# Patient Record
Sex: MALE | Race: WHITE | NOT HISPANIC OR LATINO | Employment: OTHER | URBAN - METROPOLITAN AREA
[De-identification: names, ages, dates, MRNs, and addresses within clinical notes are randomized per-mention and may not be internally consistent; named-entity substitution may affect disease eponyms.]

---

## 2018-01-09 ENCOUNTER — OFFICE VISIT (OUTPATIENT)
Dept: FAMILY MEDICINE CLINIC | Facility: CLINIC | Age: 63
End: 2018-01-09

## 2018-01-09 VITALS
WEIGHT: 196 LBS | SYSTOLIC BLOOD PRESSURE: 130 MMHG | OXYGEN SATURATION: 98 % | HEART RATE: 72 BPM | DIASTOLIC BLOOD PRESSURE: 92 MMHG | BODY MASS INDEX: 29.7 KG/M2 | TEMPERATURE: 98.5 F | HEIGHT: 68 IN

## 2018-01-09 DIAGNOSIS — K62.89 CYST OF PERIANAL AREA: Primary | ICD-10-CM

## 2018-01-09 PROCEDURE — 99213 OFFICE O/P EST LOW 20 MIN: CPT | Performed by: INTERNAL MEDICINE

## 2018-01-09 RX ORDER — SULFAMETHOXAZOLE AND TRIMETHOPRIM 800; 160 MG/1; MG/1
1 TABLET ORAL 2 TIMES DAILY
Qty: 20 TABLET | Refills: 0 | Status: SHIPPED | OUTPATIENT
Start: 2018-01-09 | End: 2018-02-20

## 2018-01-09 NOTE — PROGRESS NOTES
Subjective   Maximus Bhardwaj Sr. is a 62 y.o. male.   Patient with irritation with some drainage noted in the peroneal area is been going on for a couple weeks.  Mild soreness with this.   History of Present Illness   As above.  There is no blood when he wipes himself does not see blood in the stool per se.  No reported temperature with this.  No prior problems with this.  No history of pilonidal cyst.    Review of Systems   All other systems reviewed and are negative.      Objective   Vitals:    01/09/18 0845   BP: 130/92   Pulse: 72   Temp: 98.5 °F (36.9 °C)   SpO2: 98%   Weight: 88.9 kg (196 lb)     Physical Exam   Constitutional: He appears well-developed and well-nourished.   HENT:   Head: Normocephalic and atraumatic.   Cardiovascular: Normal rate, regular rhythm and normal heart sounds.    Pulmonary/Chest: Effort normal and breath sounds normal.   Abdominal: Soft. Bowel sounds are normal.   Genitourinary:   Genitourinary Comments: No noted inguinal hernias with visual inspection of the front inguinal areas.  Look at the perineum there is an area approximately 2 cm depth with right side being more to the left was painful to touch minimal redness is questionable poor on the left side is not most tender side.  Findings consistent.  With peroneal cyst infected   Neurological: He is alert.   Unremarkable gait   Nursing note and vitals reviewed.      Lab Results   Component Value Date    INR 0.9 08/11/2014       Procedures    Assessment/Plan     Peroneal cyst plan Septra DS twice a day for 10 days' time since past 2-4 times per day refer to surgeon try to see by Thursday this week.  Patient aware if situation worsens go to ER.    PS I could not list peroneal cyst in our system it only acknowledges peroneal cyst in a female.  Presumably design flaw in  [facility]    Much of this encounter note is an electronic transcription/translation of spoken language to printed text.  The electronic translation of spoken language  may permit erroneous, or at times, nonsensical words or phrases to be inadvertently transcribed.  Although I have reviewed the note for such errors, some may still exist.

## 2018-02-20 ENCOUNTER — OFFICE VISIT (OUTPATIENT)
Dept: FAMILY MEDICINE CLINIC | Facility: CLINIC | Age: 63
End: 2018-02-20

## 2018-02-20 VITALS
DIASTOLIC BLOOD PRESSURE: 82 MMHG | HEART RATE: 69 BPM | OXYGEN SATURATION: 98 % | TEMPERATURE: 98.2 F | SYSTOLIC BLOOD PRESSURE: 140 MMHG | WEIGHT: 188.4 LBS | BODY MASS INDEX: 28.55 KG/M2 | HEIGHT: 68 IN

## 2018-02-20 DIAGNOSIS — J01.00 ACUTE MAXILLARY SINUSITIS, RECURRENCE NOT SPECIFIED: ICD-10-CM

## 2018-02-20 DIAGNOSIS — R93.89 ABNORMAL CXR (CHEST X-RAY): ICD-10-CM

## 2018-02-20 DIAGNOSIS — J20.9 BRONCHITIS WITH BRONCHOSPASM: Primary | ICD-10-CM

## 2018-02-20 LAB
ANION GAP SERPL CALCULATED.3IONS-SCNC: 12.9 MMOL/L
BUN BLD-MCNC: 14 MG/DL (ref 8–23)
BUN/CREAT SERPL: 15.7 (ref 7–25)
CALCIUM SPEC-SCNC: 9.2 MG/DL (ref 8.6–10.5)
CHLORIDE SERPL-SCNC: 101 MMOL/L (ref 98–107)
CO2 SERPL-SCNC: 25.1 MMOL/L (ref 22–29)
CREAT BLD-MCNC: 0.89 MG/DL (ref 0.76–1.27)
GFR SERPL CREATININE-BSD FRML MDRD: 87 ML/MIN/1.73
GLUCOSE BLD-MCNC: 87 MG/DL (ref 65–99)
POTASSIUM BLD-SCNC: 4.5 MMOL/L (ref 3.5–5.2)
SODIUM BLD-SCNC: 139 MMOL/L (ref 136–145)

## 2018-02-20 PROCEDURE — 71046 X-RAY EXAM CHEST 2 VIEWS: CPT | Performed by: INTERNAL MEDICINE

## 2018-02-20 PROCEDURE — 36415 COLL VENOUS BLD VENIPUNCTURE: CPT | Performed by: INTERNAL MEDICINE

## 2018-02-20 PROCEDURE — 99214 OFFICE O/P EST MOD 30 MIN: CPT | Performed by: INTERNAL MEDICINE

## 2018-02-20 PROCEDURE — 80048 BASIC METABOLIC PNL TOTAL CA: CPT | Performed by: INTERNAL MEDICINE

## 2018-02-20 RX ORDER — AZITHROMYCIN 250 MG/1
TABLET, FILM COATED ORAL
Qty: 6 TABLET | Refills: 0 | Status: SHIPPED | OUTPATIENT
Start: 2018-02-20 | End: 2020-08-31

## 2018-02-20 RX ORDER — BENZONATATE 100 MG/1
CAPSULE ORAL
Qty: 30 CAPSULE | Refills: 0 | Status: SHIPPED | OUTPATIENT
Start: 2018-02-20 | End: 2020-08-31

## 2018-02-20 RX ORDER — PREDNISONE 10 MG/1
10 TABLET ORAL DAILY
Qty: 20 TABLET | Refills: 0 | Status: SHIPPED | OUTPATIENT
Start: 2018-02-20 | End: 2020-08-31

## 2018-02-20 RX ORDER — BUDESONIDE AND FORMOTEROL FUMARATE DIHYDRATE 160; 4.5 UG/1; UG/1
AEROSOL RESPIRATORY (INHALATION)
Qty: 1 INHALER | Refills: 0 | Status: SHIPPED | OUTPATIENT
Start: 2018-02-20 | End: 2020-08-31

## 2018-02-20 RX ORDER — ALBUTEROL SULFATE 90 UG/1
2 AEROSOL, METERED RESPIRATORY (INHALATION) EVERY 4 HOURS PRN
Qty: 1 INHALER | Refills: 0 | Status: SHIPPED | OUTPATIENT
Start: 2018-02-20 | End: 2021-04-28 | Stop reason: HOSPADM

## 2018-02-20 NOTE — PROGRESS NOTES
Subjective   Maximus Bhardwaj Sr. is a 62 y.o. male.   Coughing for 2 weeks with green sputum no temperature  History of Present Illness   Rein sputum production with cough, yellow sinus drainage also patient is a smoker is not interested in smoking cessation.  Tendency get a bronchitis about twice yearly on average.  His hearing himself wheeze some.    Review of Systems   HENT: Positive for congestion and sinus pressure.    Respiratory: Positive for cough.    All other systems reviewed and are negative.      Objective   Vitals:    02/20/18 1111   BP: 140/82   Pulse: 69   Temp: 98.2 °F (36.8 °C)   SpO2: 98%   Weight: 85.5 kg (188 lb 6.4 oz)     Physical Exam   Constitutional: He appears well-developed and well-nourished.   HENT:   Head: Normocephalic and atraumatic.   Right Ear: External ear normal.   Left Ear: External ear normal.   Patient's notes yellow sinus drainage is from his right maxillary sinus  predominantly   Eyes: Conjunctivae are normal. Pupils are equal, round, and reactive to light.   Cardiovascular: Normal rate, regular rhythm and normal heart sounds.    Pulmonary/Chest: Effort normal.   Wheezes predominantly on the right   Abdominal: Soft. Bowel sounds are normal.   Neurological: He is alert.   Unremarkable gait and station   Skin: Skin is warm and dry.   Nursing note and vitals reviewed.      Lab Results   Component Value Date    INR 0.9 08/11/2014       Procedures  Chest x-ray PA and lateral obtained.  We do have a comparison from 4/20/15.  No active.  A prominent left hilum perhaps lymph node we'll proceed with CT of the chest.  No other bony or soft tissue abnormalities are noted.  Assessment/Plan     1.  Bronchitis with bronchospasm plan Z-Momo,Prednisone 40 mg ×2 days, 30 mg ×2 days, 20 mg ×2 days, 10 mg ×2 days.,  Symbicort 160, albuterol inhaler, Tessalon.  Follow-up not well in 10 days' time    2.  Abnormal chest x-ray with probable left hilum plan await BMP but probable CT of chest with  contrast patient is a smoker    3.  Sinusitis plan Z-Momo should suffice for this.  Follow-up not well in this also in 10 days    Much of this encounter note is an electronic transcription/translation of spoken language to printed text.  The electronic translation of spoken language may permit erroneous, or at times, nonsensical words or phrases to be inadvertently transcribed.  Although I have reviewed the note for such errors, some may still exist.

## 2018-02-21 DIAGNOSIS — R93.89 ABNORMAL CHEST X-RAY: Primary | ICD-10-CM

## 2018-02-26 ENCOUNTER — TELEPHONE (OUTPATIENT)
Dept: FAMILY MEDICINE CLINIC | Facility: CLINIC | Age: 63
End: 2018-02-26

## 2018-02-27 ENCOUNTER — APPOINTMENT (OUTPATIENT)
Dept: CT IMAGING | Facility: HOSPITAL | Age: 63
End: 2018-02-27
Attending: INTERNAL MEDICINE

## 2018-03-02 ENCOUNTER — TELEPHONE (OUTPATIENT)
Dept: FAMILY MEDICINE CLINIC | Facility: CLINIC | Age: 63
End: 2018-03-02

## 2018-03-02 DIAGNOSIS — R89.9 ABNORMAL LABORATORY TEST RESULT: Primary | ICD-10-CM

## 2018-03-02 DIAGNOSIS — E27.8 ADRENAL HYPERPLASIA (HCC): Primary | ICD-10-CM

## 2018-03-02 DIAGNOSIS — E24.8: Primary | ICD-10-CM

## 2018-03-02 NOTE — TELEPHONE ENCOUNTER
CT of chest was relatively benign evidence of bronchitis minimal chronic changes from smoking so patient still smokes needs to quit additionally we find evidence of probable adrenal gland hyperplasia which is going for outpatient referral to endocrinologist will also need to get his screening blood cortisol from him and this will at least be pursued but just a big deal.  Tumor showed up on a CT.

## 2019-12-16 ENCOUNTER — OFFICE VISIT (OUTPATIENT)
Dept: FAMILY MEDICINE CLINIC | Facility: CLINIC | Age: 64
End: 2019-12-16

## 2019-12-16 VITALS
DIASTOLIC BLOOD PRESSURE: 80 MMHG | BODY MASS INDEX: 28.19 KG/M2 | HEART RATE: 72 BPM | HEIGHT: 68 IN | WEIGHT: 186 LBS | OXYGEN SATURATION: 98 % | SYSTOLIC BLOOD PRESSURE: 146 MMHG | TEMPERATURE: 97.9 F

## 2019-12-16 DIAGNOSIS — R93.89 ABNORMAL CXR: ICD-10-CM

## 2019-12-16 DIAGNOSIS — R07.89 POSTERIOR CHEST PAIN: Primary | ICD-10-CM

## 2019-12-16 DIAGNOSIS — M54.6 THORACIC SPINE PAIN: ICD-10-CM

## 2019-12-16 LAB
ANION GAP SERPL CALCULATED.3IONS-SCNC: 9.9 MMOL/L (ref 5–15)
BUN BLD-MCNC: 11 MG/DL (ref 8–23)
BUN/CREAT SERPL: 12.9 (ref 7–25)
CALCIUM SPEC-SCNC: 9.5 MG/DL (ref 8.6–10.5)
CHLORIDE SERPL-SCNC: 102 MMOL/L (ref 98–107)
CO2 SERPL-SCNC: 28.1 MMOL/L (ref 22–29)
CREAT BLD-MCNC: 0.85 MG/DL (ref 0.76–1.27)
GFR SERPL CREATININE-BSD FRML MDRD: 91 ML/MIN/1.73
GLUCOSE BLD-MCNC: 99 MG/DL (ref 65–99)
POTASSIUM BLD-SCNC: 5.1 MMOL/L (ref 3.5–5.2)
SODIUM BLD-SCNC: 140 MMOL/L (ref 136–145)

## 2019-12-16 PROCEDURE — 99214 OFFICE O/P EST MOD 30 MIN: CPT | Performed by: INTERNAL MEDICINE

## 2019-12-16 PROCEDURE — 36415 COLL VENOUS BLD VENIPUNCTURE: CPT | Performed by: INTERNAL MEDICINE

## 2019-12-16 PROCEDURE — 80048 BASIC METABOLIC PNL TOTAL CA: CPT | Performed by: INTERNAL MEDICINE

## 2019-12-16 PROCEDURE — 72070 X-RAY EXAM THORAC SPINE 2VWS: CPT | Performed by: INTERNAL MEDICINE

## 2019-12-16 PROCEDURE — 71046 X-RAY EXAM CHEST 2 VIEWS: CPT | Performed by: INTERNAL MEDICINE

## 2019-12-16 NOTE — PROGRESS NOTES
Subjective   Maximus Bhardwaj Sr. is a 64 y.o. male.  Very recent right posterior lung pain  Body mass index is 28.28 kg/m².  History of Present Illness   Patient denied anything overtly physical was carrying a light weight pack on his right arm living with sudden pain in mid thoracic spine area mainly to the right of the midline.  Possible somewhat increased with breath as well.  It was transient without further recurrence no known pneumothorax by history no lung injury or significant COPD reported.  Patient is a prior smoker.  No increased temperature as well.  Haslumbar issues mainly lumbar but no mid back i.e. thoracic spine problems he is aware.  Family is positive for hypertension allergies heart disease alcohol abuse and heart attack.  Non-smoker.  Former smoker  Review of Systems   Respiratory:        Posterior chest pain on right.   Musculoskeletal:        Recent midline back pain.  As referenced in chart   All other systems reviewed and are negative.      Objective   Vitals:    12/16/19 0946   BP: 146/80   Pulse: 72   Temp: 97.9 °F (36.6 °C)   SpO2: 98%   Weight: 84.4 kg (186 lb)     Physical Exam   Constitutional: He appears well-developed and well-nourished.   HENT:   Head: Normocephalic and atraumatic.   Eyes: Pupils are equal, round, and reactive to light. Conjunctivae are normal.   Cardiovascular: Normal rate, regular rhythm and normal heart sounds.   Pulmonary/Chest: Effort normal and breath sounds normal.   No wheezing or diminished breath sounds noted.   Abdominal: Soft. Bowel sounds are normal.   Musculoskeletal:   Patient with mild discomfort primarily to the medial right of T5-T6 area.  No true focal tenderness with this.  Flexion back is fairly good getting 80 degrees forward, 15 degrees backwards but with discomfort or degrees right lateral flexion 20 degrees with left lateral flexion but discomfort left lateral flexion as well.   Neurological: He is alert.   Unremarkable gait and station    Skin: Skin is warm and dry.   Nursing note and vitals reviewed.      Lab Results   Component Value Date    INR 0.9 08/11/2014       Procedures  T-spine x-ray PA and lateral obtained.  No comparison available.  Does have a subtle degree of scoliosis around the C7 up to C6 area.  Mild narrowing of the upper T-spine vertebrae seen best on lateral view.  Somewhat subtle minimal spurring noted as well.    Chest x-ray PA lateral obtained.  No active parenchymal infiltrates seen.  We do have prominence of the left hilum this is a change from 2/20/2018 for we have a comparison x-ray no other bony or soft tissue abnormalities are noted.  will proceed with CT of chest for this  Assessment/Plan   1.  Posterior chest pain somewhat pleuritic transient now resolved    2.  Thoracic spine pain plan observation for now if pain persists or recur would strong consider doing MRI of the thoracic spine    3.  Abnormal chest x-ray/prominent left hilum CT of chest with contrast will get updated BMP to make sure his creatinine is satisfactory for pending test    Much of this encounter note is an electronic transcription/translation of spoken language to printed text.  The electronic translation of spoken language may permit erroneous, or at times, nonsensical words or phrases to be inadvertently transcribed.  Although I have reviewed the note for such errors, some may still exist. If there are questions or for further clarification, please contact me.

## 2020-02-07 ENCOUNTER — TELEPHONE (OUTPATIENT)
Dept: FAMILY MEDICINE CLINIC | Facility: CLINIC | Age: 65
End: 2020-02-07

## 2020-02-07 NOTE — TELEPHONE ENCOUNTER
HE HAS TAKEN OUT CO[employer] INSURANCE AND NOW WANTS TO PROCEED WITH BEING SET UP WITH A LUNG SPECIALIST.

## 2020-02-10 DIAGNOSIS — R93.89 ABNORMAL CHEST X-RAY: Primary | ICD-10-CM

## 2020-04-20 ENCOUNTER — TELEPHONE (OUTPATIENT)
Dept: FAMILY MEDICINE CLINIC | Facility: CLINIC | Age: 65
End: 2020-04-20

## 2020-04-20 NOTE — TELEPHONE ENCOUNTER
Need more detail I do not find him listed as being in immediate care center see which one he actually used.  1 of the chest x-ray was obtained there is exertional chest pain component and if he is running a temperature

## 2020-04-20 NOTE — TELEPHONE ENCOUNTER
PATIENT FOR A WEEK HAS HAD TIGHTNESS IN HIS CHEST. A FEW YEARS AGO HE WS TAKING XANAX FOR STRESS. HE THOUGHT IT WAS JUST STRESS FROM COVID. FRIDAY NIGHT HE WENT TO THE [facility] AND THEY DIAGNOSED HIM WITH BRONCHITIS, THEY GAVE HIM AN INHALER AND PREDNISONE. PATIENT HAS BEEN USING IT FOR 3 DAYS AND IT DOESN'T SEEM TO BE GETTING ANY BETTER, THOUGH HE IS NOT HAVING THE DIFFICULTY BREATHING ANYMORE.     HE WOULD LIKE TO KNOW IF AMY ROD WOULD CALL HIM IN AN ANTIBIOTIC.     PATIENT CALLBACK [phone]

## 2020-04-21 DIAGNOSIS — Z51.81 MEDICATION MONITORING ENCOUNTER: Primary | ICD-10-CM

## 2020-04-21 RX ORDER — ESCITALOPRAM OXALATE 10 MG/1
TABLET ORAL
Qty: 30 TABLET | Refills: 0 | Status: SHIPPED | OUTPATIENT
Start: 2020-04-21 | End: 2020-05-22 | Stop reason: SDUPTHER

## 2020-04-21 NOTE — TELEPHONE ENCOUNTER
Please try to retrieve the information from [facility] is not in the EMR at this point in time I will probably try to call him back

## 2020-04-21 NOTE — TELEPHONE ENCOUNTER
Woke with patient feeling somewhat stressed likely due to coronavirus news has had episodes where he is chest tightness and short of breath in the past said several cardiac work-ups done which are negative after negative cardiac work-up in the past was placed on medication by his cardiologist it was alprazolam does not take a long-acting antianxiety medicine will initiate that today we did discuss the hospital 1 requirements no personal family history of drug abuse issue or concerns about a member stealing from him we will have him come in to a contract for Xanax with [facility] per contract displays being here tomorrow put an order in for same I will resend in the Lexapro for him to start today

## 2020-04-21 NOTE — TELEPHONE ENCOUNTER
PT  CALLED SAYING THAT HE WENT TO [facility] YESTERDAY. THEY DID SEVERAL TESTS, LABS, EKG, XRAY, CT,  ON THE PT AND COULD NOT FIND ANYTHING WRONG. THEY EVEN TOLD HIM THAT THEY DO NOT BELIEVE THAT HE HAS BRONCHITIS LIKE THE [facility] TOLD HIM AND ADVISED HIM TO STOP THE MEDS HE WAS GIVEN. THE DR HE SAW THERE TOLD HIM THAT IT DOES SOUND LIKE THESE SYMPTOMS ARE STRESS RELATED. PT WOULD LIKE TO KNOW WHAT HE CAN DO TO HELP MANAGE HIS STRESS AT THIS TIME.

## 2020-04-22 ENCOUNTER — LAB (OUTPATIENT)
Dept: FAMILY MEDICINE CLINIC | Facility: CLINIC | Age: 65
End: 2020-04-22

## 2020-04-22 DIAGNOSIS — Z51.81 MEDICATION MONITORING ENCOUNTER: ICD-10-CM

## 2020-04-22 RX ORDER — ALPRAZOLAM 0.25 MG/1
0.25 TABLET ORAL 3 TIMES DAILY PRN
Qty: 21 TABLET | Refills: 0 | Status: SHIPPED | OUTPATIENT
Start: 2020-04-22 | End: 2020-04-28 | Stop reason: SDUPTHER

## 2020-04-22 NOTE — TELEPHONE ENCOUNTER
Sent through Xanax 0.25 #21 take half to whole tablet every 8 hours as needed we will salivate last await pending urine drug screen.  Thanks regarding Denny

## 2020-04-22 NOTE — TELEPHONE ENCOUNTER
Let me know if there are any red flags on Denny if can do that due to hectic day have somebody please scanned into system

## 2020-04-24 LAB — CONV REPORT SUMMARY: NORMAL

## 2020-04-28 RX ORDER — ALPRAZOLAM 0.25 MG/1
0.5 TABLET ORAL 3 TIMES DAILY PRN
Qty: 21 TABLET | Refills: 0 | Status: SHIPPED | OUTPATIENT
Start: 2020-04-28 | End: 2020-05-01

## 2020-04-28 NOTE — TELEPHONE ENCOUNTER
would like to know if his dosage could be increased. He states he has to take two at a time to get relief.

## 2020-05-01 ENCOUNTER — TELEPHONE (OUTPATIENT)
Dept: FAMILY MEDICINE CLINIC | Facility: CLINIC | Age: 65
End: 2020-05-01

## 2020-05-01 DIAGNOSIS — F41.9 ANXIETY: Primary | ICD-10-CM

## 2020-05-01 DIAGNOSIS — J34.89 LESION OF NOSE: ICD-10-CM

## 2020-05-01 RX ORDER — ALPRAZOLAM 0.5 MG/1
0.5 TABLET ORAL 3 TIMES DAILY PRN
Qty: 60 TABLET | Refills: 0 | Status: SHIPPED | OUTPATIENT
Start: 2020-05-01 | End: 2020-05-04 | Stop reason: SDUPTHER

## 2020-05-01 NOTE — TELEPHONE ENCOUNTER
Need clarification if patient is taken 1 pill twice a day and got 21 that would last him 10.5 days.  Also s see if he is on the 0.25 or 0.5 mg

## 2020-05-01 NOTE — TELEPHONE ENCOUNTER
You did start him on .25 and then it wasn't helping so you advised him to try 2 so you could increase dose at bid or however you want him to take it.

## 2020-05-01 NOTE — TELEPHONE ENCOUNTER
You increased him to 2 pills twice daily and he is doing much better now. He needs dermatology referral due to insurance has seen Dr Shaver years ago.

## 2020-05-01 NOTE — TELEPHONE ENCOUNTER
Pt called and stated that he needs a refill for ALPRAZolam (XANAX) 0.25 MG tablet. Pt states taking 2 a day is working and he is no longer having chest pains, but he would like more than a quantity of 21 due to it only lasting 3.5 days     Please advise   [phone]    Pt also states he noticed he has a red sploch on nose and it is painful to the touch. Pt would like to get it tested to see if its melanoma.     [facility] [address] - [place], [place] - [address] [place] [address] AT [facility] - [phone]  - [phone] FX

## 2020-05-04 DIAGNOSIS — F41.9 ANXIETY: ICD-10-CM

## 2020-05-04 RX ORDER — ALPRAZOLAM 0.5 MG/1
0.5 TABLET ORAL 3 TIMES DAILY PRN
Qty: 60 TABLET | Refills: 0 | Status: SHIPPED | OUTPATIENT
Start: 2020-05-04 | End: 2020-05-22 | Stop reason: SDUPTHER

## 2020-05-04 NOTE — TELEPHONE ENCOUNTER
Patient informed this was sent to [facility] on [address].[facility]

## 2020-05-04 NOTE — TELEPHONE ENCOUNTER
PT CALLED STATING THE PHARMACY DOES NOT HAVE THE REFILL REQUEST ALPRAZolam (XANAX) 0.5 MG tablet    [facility] CONFIRMED     PT CALL BACK   [phone]

## 2020-05-22 DIAGNOSIS — F41.9 ANXIETY: ICD-10-CM

## 2020-05-22 RX ORDER — ALPRAZOLAM 0.5 MG/1
0.5 TABLET ORAL 3 TIMES DAILY PRN
Qty: 60 TABLET | Refills: 0 | Status: SHIPPED | OUTPATIENT
Start: 2020-05-22 | End: 2020-06-11

## 2020-05-22 RX ORDER — ESCITALOPRAM OXALATE 10 MG/1
TABLET ORAL
Qty: 30 TABLET | Refills: 0 | Status: SHIPPED | OUTPATIENT
Start: 2020-05-22 | End: 2020-06-19 | Stop reason: SINTOL

## 2020-05-22 NOTE — TELEPHONE ENCOUNTER
Medication Refill Request      Patient Name:  Maximus Bhardwaj Sr.    :  1955    MRN:  [id number]      Patient would like the medication refilled below:  Requested Prescriptions     Pending Prescriptions Disp Refills   • ALPRAZolam (XANAX) 0.5 MG tablet 60 tablet 0     Sig: Take 1 tablet by mouth 3 (Three) Times a Day As Needed for Anxiety.   • escitalopram (Lexapro) 10 MG tablet 30 tablet 0     Sig: Half tablet daily for 1 week then go to whole tablet daily        [facility]: [facility] [address]  How many doses left:   WILL BE OUT OF escitalopram Saturday  WILL BE OUT OF ALPRAZolam ON    Best call back number: [phone]     PATIENT STATES THAT HE HAS STARTED WORKING  AND WOULD LIKE TO INFORM DR. REYES THAT HIS CHEST PAIN HAS SUBSIDED FOR NOW.

## 2020-06-09 DIAGNOSIS — F41.9 ANXIETY: ICD-10-CM

## 2020-06-11 RX ORDER — ALPRAZOLAM 0.5 MG/1
TABLET ORAL
Qty: 60 TABLET | Refills: 0 | Status: SHIPPED | OUTPATIENT
Start: 2020-06-11 | End: 2020-07-01

## 2020-06-12 ENCOUNTER — TELEPHONE (OUTPATIENT)
Dept: FAMILY MEDICINE CLINIC | Facility: CLINIC | Age: 65
End: 2020-06-12

## 2020-06-12 RX ORDER — LISINOPRIL 10 MG/1
10 TABLET ORAL DAILY
Qty: 30 TABLET | Refills: 0 | Status: SHIPPED | OUTPATIENT
Start: 2020-06-12 | End: 2020-07-08

## 2020-06-12 NOTE — TELEPHONE ENCOUNTER
PATIENT CALLED AND STATES HE HAD TALKED WITH ZANA IN REGARDS TO MEDICATION     AMLODIPINE BESYLATE 5 MG   HIS CARDIOLOGIST HAS PRESCRIBED THIS TO HIM.     PLEASE CALL PATIENT [phone]   AND ADVISE AS TO CONTINUING THIS MED AND LISINOPRIL OR STOPPING LISINOPRIL

## 2020-06-12 NOTE — TELEPHONE ENCOUNTER
Blood pressure is 146/80 when patient was at office if he consistently takes amlodipine with that blood pressure reading it is appropriate to add in lisinopril also would take both for now.  Of note lisinopril will help LVH amlodipine will not help blood pressures not as effective as lisinopril and the beta-blockers for LVH.  He is welcome to discuss this with his cardiologist as well.  We do need his blood pressure consistently below 140/90 preferably with the benefit of lisinopril to help with follow-up echocardiogram in 1 years time.  PS should all be appropriate for his cardiologist be where the recent echocardiogram report

## 2020-06-18 ENCOUNTER — TELEPHONE (OUTPATIENT)
Dept: FAMILY MEDICINE CLINIC | Facility: CLINIC | Age: 65
End: 2020-06-18

## 2020-06-18 NOTE — TELEPHONE ENCOUNTER
PT CALLED WITH QUESTIONS REGARDING HIS MEDICATION. PT STATES HE IS UNABLE TO SLEEP AND STATING ALL HIS MEDICATIONS ARE THE CAUSE. PT STATES HE HAS BEEN TAKING MELATONIN OVER THE COUNTER FOR THE LAST 4 DAYS AND IT HAS HELPED HIM TO SLEEP. PT WANTS TO KNOW IF IT IS OK FOR HIM TO TAKE.     PLEASE ADVISE     PT CALL BACK   [phone]

## 2020-06-18 NOTE — TELEPHONE ENCOUNTER
It is okay to take the melatonin.  Regarding his meds the generic Lexapro slightly more like giving him insomnia can substitute another one in kind if he wishes.

## 2020-06-18 NOTE — TELEPHONE ENCOUNTER
Informed re: Melatonin.,He said he does think he would like to try change send to [facility]

## 2020-06-19 ENCOUNTER — TELEPHONE (OUTPATIENT)
Dept: FAMILY MEDICINE CLINIC | Facility: CLINIC | Age: 65
End: 2020-06-19

## 2020-06-19 RX ORDER — FLUOXETINE 10 MG/1
CAPSULE ORAL
Qty: 30 CAPSULE | Refills: 0 | Status: SHIPPED | OUTPATIENT
Start: 2020-06-19 | End: 2020-07-14

## 2020-06-19 NOTE — TELEPHONE ENCOUNTER
Checking directions on Prozac,says begin 1 tablet nightly for 2 weeks and then can increase, what is he increasing to?

## 2020-06-19 NOTE — TELEPHONE ENCOUNTER
Switching to 2 tablets nightly which is 20 mg from 10 mg.  The 20 g is the usual dose that locks in 80% of people I would have him do that for a month if he is tolerating that then certainly go higher if we need to.  But begin at the 10 mg dose since he does seem to be sensitive to medicine

## 2020-06-24 NOTE — TELEPHONE ENCOUNTER
Patient is calling back to check the status of the request to clarify the instructions on the Prozac.  Patient states he has been trying to get this for several days.    Please advise.    Patient can be reached at [phone]    [address] [facility] AT [facility] - [phone]  - [phone]   [phone]  Confirmed

## 2020-07-01 DIAGNOSIS — F41.9 ANXIETY: ICD-10-CM

## 2020-07-01 RX ORDER — ALPRAZOLAM 0.5 MG/1
TABLET ORAL
Qty: 60 TABLET | Refills: 0 | Status: SHIPPED | OUTPATIENT
Start: 2020-07-01 | End: 2020-07-20

## 2020-07-08 RX ORDER — LISINOPRIL 10 MG/1
TABLET ORAL
Qty: 30 TABLET | Refills: 0 | Status: SHIPPED | OUTPATIENT
Start: 2020-07-08 | End: 2020-08-06

## 2020-07-14 ENCOUNTER — TELEPHONE (OUTPATIENT)
Dept: FAMILY MEDICINE CLINIC | Facility: CLINIC | Age: 65
End: 2020-07-14

## 2020-07-14 RX ORDER — FLUOXETINE 10 MG/1
CAPSULE ORAL
Qty: 30 CAPSULE | Refills: 3 | Status: SHIPPED | OUTPATIENT
Start: 2020-07-14 | End: 2020-08-31 | Stop reason: DRUGHIGH

## 2020-07-14 NOTE — TELEPHONE ENCOUNTER
PHARMACY IS NEEDING CLARIFICATION ON INSTRUCTIONS AND QUANTITY, FLUoxetine (PROzac) 10 MG capsule    PHARMACY CAN BE REACHED [phone]

## 2020-07-14 NOTE — TELEPHONE ENCOUNTER
I simply did a refill on his medication if he is taking 2 2 mg tablets of fluoxetine is effective for him would simply switch him to the 20 mg dose let me know

## 2020-07-15 RX ORDER — FLUOXETINE HYDROCHLORIDE 20 MG/1
20 CAPSULE ORAL DAILY
Qty: 90 CAPSULE | Refills: 0 | Status: SHIPPED | OUTPATIENT
Start: 2020-07-15 | End: 2020-10-14

## 2020-07-20 DIAGNOSIS — F41.9 ANXIETY: ICD-10-CM

## 2020-07-20 RX ORDER — ALPRAZOLAM 0.5 MG/1
TABLET ORAL
Qty: 60 TABLET | Refills: 0 | Status: SHIPPED | OUTPATIENT
Start: 2020-07-20 | End: 2020-08-10

## 2020-07-23 ENCOUNTER — TELEPHONE (OUTPATIENT)
Dept: FAMILY MEDICINE CLINIC | Facility: CLINIC | Age: 65
End: 2020-07-23

## 2020-07-23 NOTE — TELEPHONE ENCOUNTER
durlax I am not sure about exactly what it he is the other one interpreted as MiraLAX.  Then the mag sulfate interpreted as mag citrate if he still has not have a bowel movement that is since significant stimulants for the colon I would simply have him go the ER see if he has a big impaction or even obstruction going on.

## 2020-07-23 NOTE — TELEPHONE ENCOUNTER
PATIENT CALLED IN TO REQUEST SOMETHING BE CALLED IN FOR A BOWEL MOVEMENT . PATIENT STATED HE ISEN'T HAVING ONE AND HAS TRIED DURLAX DRANK A HALF OF GALLON  AND SIRVALAX FROM THE PHARMACY AND MAGNESIUM SULFATE AND NOTHING .  HE IS HAVING MORE LIQUID BOWEL  MOVEMENT .        SENT TO [address] [address] [place] [address] AT [facility] - [phone]  - [phone] FX  [phone]      PATIENT CALLBACK  [phone]

## 2020-08-06 RX ORDER — LISINOPRIL 10 MG/1
TABLET ORAL
Qty: 30 TABLET | Refills: 0 | Status: SHIPPED | OUTPATIENT
Start: 2020-08-06 | End: 2020-09-08

## 2020-08-10 DIAGNOSIS — F41.9 ANXIETY: ICD-10-CM

## 2020-08-10 RX ORDER — ALPRAZOLAM 0.5 MG/1
TABLET ORAL
Qty: 60 TABLET | Refills: 0 | Status: SHIPPED | OUTPATIENT
Start: 2020-08-10 | End: 2020-08-27

## 2020-08-27 DIAGNOSIS — F41.9 ANXIETY: ICD-10-CM

## 2020-08-27 RX ORDER — ALPRAZOLAM 0.5 MG/1
TABLET ORAL
Qty: 60 TABLET | Refills: 0 | Status: SHIPPED | OUTPATIENT
Start: 2020-08-27 | End: 2020-09-17

## 2020-08-31 ENCOUNTER — OFFICE VISIT (OUTPATIENT)
Dept: FAMILY MEDICINE CLINIC | Facility: CLINIC | Age: 65
End: 2020-08-31

## 2020-08-31 VITALS
BODY MASS INDEX: 26.07 KG/M2 | SYSTOLIC BLOOD PRESSURE: 110 MMHG | WEIGHT: 172 LBS | HEIGHT: 68 IN | HEART RATE: 83 BPM | DIASTOLIC BLOOD PRESSURE: 60 MMHG | TEMPERATURE: 97.8 F | OXYGEN SATURATION: 99 %

## 2020-08-31 DIAGNOSIS — I10 HYPERTENSION, ESSENTIAL: ICD-10-CM

## 2020-08-31 DIAGNOSIS — E87.6 HYPOKALEMIA: Primary | ICD-10-CM

## 2020-08-31 DIAGNOSIS — R53.83 FATIGUE, UNSPECIFIED TYPE: ICD-10-CM

## 2020-08-31 LAB
ALBUMIN SERPL-MCNC: 4.3 G/DL (ref 3.5–5.2)
ALBUMIN/GLOB SERPL: 1.6 G/DL
ALP SERPL-CCNC: 64 U/L (ref 39–117)
ALT SERPL W P-5'-P-CCNC: 17 U/L (ref 1–41)
ANION GAP SERPL CALCULATED.3IONS-SCNC: 6.2 MMOL/L (ref 5–15)
AST SERPL-CCNC: 18 U/L (ref 1–40)
BILIRUB SERPL-MCNC: 0.5 MG/DL (ref 0–1.2)
BUN SERPL-MCNC: 12 MG/DL (ref 8–23)
BUN/CREAT SERPL: 11.4 (ref 7–25)
CALCIUM SPEC-SCNC: 9.4 MG/DL (ref 8.6–10.5)
CHLORIDE SERPL-SCNC: 99 MMOL/L (ref 98–107)
CO2 SERPL-SCNC: 26.8 MMOL/L (ref 22–29)
CREAT SERPL-MCNC: 1.05 MG/DL (ref 0.76–1.27)
ERYTHROCYTE [DISTWIDTH] IN BLOOD BY AUTOMATED COUNT: 13.9 % (ref 12.3–15.4)
GFR SERPL CREATININE-BSD FRML MDRD: 71 ML/MIN/1.73
GLOBULIN UR ELPH-MCNC: 2.7 GM/DL
GLUCOSE SERPL-MCNC: 95 MG/DL (ref 65–99)
HCT VFR BLD AUTO: 43.3 % (ref 37.5–51)
HGB BLD-MCNC: 14.3 G/DL (ref 13–17.7)
LYMPHOCYTES # BLD AUTO: 2.6 10*3/MM3 (ref 0.7–3.1)
LYMPHOCYTES NFR BLD AUTO: 41.9 % (ref 19.6–45.3)
MCH RBC QN AUTO: 31.4 PG (ref 26.6–33)
MCHC RBC AUTO-ENTMCNC: 32.9 G/DL (ref 31.5–35.7)
MCV RBC AUTO: 95.4 FL (ref 79–97)
MONOCYTES # BLD AUTO: 0.5 10*3/MM3 (ref 0.1–0.9)
MONOCYTES NFR BLD AUTO: 8.4 % (ref 5–12)
NEUTROPHILS NFR BLD AUTO: 3.1 10*3/MM3 (ref 1.7–7)
NEUTROPHILS NFR BLD AUTO: 49.7 % (ref 42.7–76)
PLATELET # BLD AUTO: 292 10*3/MM3 (ref 140–450)
PMV BLD AUTO: 7.9 FL (ref 6–12)
POTASSIUM SERPL-SCNC: 5.2 MMOL/L (ref 3.5–5.2)
PROT SERPL-MCNC: 7 G/DL (ref 6–8.5)
RBC # BLD AUTO: 4.54 10*6/MM3 (ref 4.14–5.8)
SODIUM SERPL-SCNC: 132 MMOL/L (ref 136–145)
TSH SERPL DL<=0.05 MIU/L-ACNC: 0.97 UIU/ML (ref 0.27–4.2)
WBC # BLD AUTO: 6.3 10*3/MM3 (ref 3.4–10.8)

## 2020-08-31 PROCEDURE — 84443 ASSAY THYROID STIM HORMONE: CPT | Performed by: INTERNAL MEDICINE

## 2020-08-31 PROCEDURE — 36415 COLL VENOUS BLD VENIPUNCTURE: CPT | Performed by: INTERNAL MEDICINE

## 2020-08-31 PROCEDURE — 80053 COMPREHEN METABOLIC PANEL: CPT | Performed by: INTERNAL MEDICINE

## 2020-08-31 PROCEDURE — 85025 COMPLETE CBC W/AUTO DIFF WBC: CPT | Performed by: INTERNAL MEDICINE

## 2020-08-31 PROCEDURE — 99214 OFFICE O/P EST MOD 30 MIN: CPT | Performed by: INTERNAL MEDICINE

## 2020-08-31 RX ORDER — AMLODIPINE BESYLATE 5 MG/1
5 TABLET ORAL DAILY
COMMUNITY
Start: 2020-08-27 | End: 2022-06-28

## 2020-08-31 NOTE — PROGRESS NOTES
Subjective Recent ER visit seen at [place] with low potassium but felt somewhat off tired no reported temperature with this dated a covert test on him which was negative.  Is feeling better now  Maximus Bhardwaj Sr. is a 65 y.o. male.   Body mass index is 26.15 kg/m².  History of Present Illness recent ER visit [place] several days ago been drinking heavily not before with a friend helped by that day but felt worse the next day and did not feel good for several days time ultimately went to the ER with finding a low potassium co-test negative this reference.  Never ran a temperature still feels somewhat tired does take 9 a day but overall feels better.  No vomiting diarrhea is unlikely because patient is lisinopril which steroid can cause hypo-kalemia but never had this before no high risk medicine such as diuretics.  No known renal issues either.    Eating banaanas  Still tired will try retrieve records from [place] ER do not have that in system at present.  Blood pressure satisfactory today.  Drug allergies are none.  Family is positive hypertension allergies heart disease alcohol abuse former smoker.  Review of Systems   Constitutional: Positive for fatigue.   All other systems reviewed and are negative.      Objective   Vitals:    08/31/20 1004   BP: 110/60   Pulse: 83   Temp: 97.8 °F (36.6 °C)   SpO2: 99%   Weight: 78 kg (172 lb)     Physical Exam   Constitutional: He appears well-developed and well-nourished.   HENT:   Head: Normocephalic and atraumatic.   Eyes: Pupils are equal, round, and reactive to light. Conjunctivae are normal.   Cardiovascular: Normal rate, regular rhythm and normal heart sounds.   Pulmonary/Chest: Effort normal and breath sounds normal.   Abdominal: Soft. Bowel sounds are normal.   Neurological: He is alert.   Unremarkable gait and station   Skin: Skin is warm and dry.   Nursing note and vitals reviewed.      Lab Results   Component Value Date    INR 0.9 08/11/2014        Procedures    Assessment/Plan 1.  Hyperkalemia status post ER visit plan get updated lab work for the regulation follow if still low despite eating bananas would give him potassium supplement    2.  Fatigue undefined get additional lab work for that    3.  Hypertension controlled continue present medicine recheck in 6 months or so.    Much of this encounter note is an electronic transcription/translation of spoken language to printed text.  The electronic translation of spoken language may permit erroneous, or at times, nonsensical words or phrases to be inadvertently transcribed.  Although I have reviewed the note for such errors, some may still exist. If there are questions or for further clarification, please contact me.    There are no diagnoses linked to this encounter.

## 2020-09-02 DIAGNOSIS — E87.1 HYPONATREMIA: Primary | ICD-10-CM

## 2020-09-08 RX ORDER — LISINOPRIL 10 MG/1
TABLET ORAL
Qty: 30 TABLET | Refills: 0 | Status: SHIPPED | OUTPATIENT
Start: 2020-09-08 | End: 2020-09-28

## 2020-09-17 DIAGNOSIS — F41.9 ANXIETY: ICD-10-CM

## 2020-09-17 RX ORDER — ALPRAZOLAM 0.5 MG/1
TABLET ORAL
Qty: 60 TABLET | Refills: 0 | Status: SHIPPED | OUTPATIENT
Start: 2020-09-17 | End: 2020-10-07

## 2020-09-28 RX ORDER — LISINOPRIL 10 MG/1
TABLET ORAL
Qty: 30 TABLET | Refills: 5 | Status: SHIPPED | OUTPATIENT
Start: 2020-09-28 | End: 2021-02-26 | Stop reason: SDUPTHER

## 2020-10-07 DIAGNOSIS — F41.9 ANXIETY: ICD-10-CM

## 2020-10-07 RX ORDER — ALPRAZOLAM 0.5 MG/1
TABLET ORAL
Qty: 60 TABLET | Refills: 0 | Status: SHIPPED | OUTPATIENT
Start: 2020-10-07 | End: 2020-10-27

## 2020-10-14 RX ORDER — FLUOXETINE HYDROCHLORIDE 20 MG/1
CAPSULE ORAL
Qty: 90 CAPSULE | Refills: 0 | Status: SHIPPED | OUTPATIENT
Start: 2020-10-14 | End: 2021-01-12

## 2020-10-26 DIAGNOSIS — F41.9 ANXIETY: ICD-10-CM

## 2020-10-27 RX ORDER — ALPRAZOLAM 0.5 MG/1
TABLET ORAL
Qty: 60 TABLET | Refills: 0 | Status: SHIPPED | OUTPATIENT
Start: 2020-10-27 | End: 2020-11-16

## 2020-11-13 DIAGNOSIS — F41.9 ANXIETY: ICD-10-CM

## 2020-11-16 RX ORDER — ALPRAZOLAM 0.5 MG/1
TABLET ORAL
Qty: 60 TABLET | Refills: 0 | Status: SHIPPED | OUTPATIENT
Start: 2020-11-16 | End: 2020-12-04

## 2020-11-16 NOTE — TELEPHONE ENCOUNTER
PT HAS WALKED TO HIS PHARMACY TO  HIS PRESCRIPTION FOR ALPRAZOLAM AND WAS TOLD THAT THEY DONT HAVE ANYTHING FOR HIM.  PT STATES THAT THIS WAS LAST FILLED ON October 27,2020 AND HE USUALLY GETS A 60 DAY SUPPLY.  PT TAKES 3 PILLS A DAY. PT IS NEEDING TO GET THIS FILLED BECAUSE HE ONLY HAS A COUPLE REMAINING    PT CALL BACK  [phone]  [facility]Y  [facility]  [address]  [phone]

## 2020-12-04 DIAGNOSIS — F41.9 ANXIETY: ICD-10-CM

## 2020-12-04 RX ORDER — ALPRAZOLAM 0.5 MG/1
TABLET ORAL
Qty: 60 TABLET | Refills: 0 | Status: SHIPPED | OUTPATIENT
Start: 2020-12-04 | End: 2020-12-28

## 2020-12-24 DIAGNOSIS — F41.9 ANXIETY: ICD-10-CM

## 2020-12-28 RX ORDER — ALPRAZOLAM 0.5 MG/1
TABLET ORAL
Qty: 60 TABLET | Refills: 0 | Status: SHIPPED | OUTPATIENT
Start: 2020-12-28 | End: 2021-01-18 | Stop reason: SDUPTHER

## 2021-01-12 RX ORDER — FLUOXETINE HYDROCHLORIDE 20 MG/1
CAPSULE ORAL
Qty: 90 CAPSULE | Refills: 1 | Status: SHIPPED | OUTPATIENT
Start: 2021-01-12 | End: 2021-02-26 | Stop reason: DRUGHIGH

## 2021-01-18 DIAGNOSIS — F41.9 ANXIETY: ICD-10-CM

## 2021-01-18 RX ORDER — ALPRAZOLAM 0.5 MG/1
0.5 TABLET ORAL 3 TIMES DAILY PRN
Qty: 60 TABLET | Refills: 0 | Status: SHIPPED | OUTPATIENT
Start: 2021-01-18 | End: 2021-02-11

## 2021-02-08 DIAGNOSIS — F41.9 ANXIETY: ICD-10-CM

## 2021-02-08 RX ORDER — ALPRAZOLAM 0.5 MG/1
TABLET ORAL
Qty: 60 TABLET | Refills: 0 | OUTPATIENT
Start: 2021-02-08

## 2021-02-09 DIAGNOSIS — F41.9 ANXIETY: ICD-10-CM

## 2021-02-09 RX ORDER — ALPRAZOLAM 0.5 MG/1
TABLET ORAL
Qty: 60 TABLET | Refills: 0 | OUTPATIENT
Start: 2021-02-09

## 2021-02-10 DIAGNOSIS — F41.9 ANXIETY: ICD-10-CM

## 2021-02-10 NOTE — TELEPHONE ENCOUNTER
PATIENT SAID THAT HE TAKES ALPRAZOLAM 3 TIMES A DAY FOR 20 DAYS. PATIENT IS OUT OF MEDICATION AND ASKED IF PRESCRIPTION COULD BE REFILLED AND SENT TO [facility] ON [address].

## 2021-02-11 RX ORDER — ALPRAZOLAM 0.5 MG/1
TABLET ORAL
Qty: 60 TABLET | Refills: 0 | Status: SHIPPED | OUTPATIENT
Start: 2021-02-11 | End: 2021-02-15 | Stop reason: SDUPTHER

## 2021-02-15 DIAGNOSIS — F41.9 ANXIETY: ICD-10-CM

## 2021-02-15 RX ORDER — ALPRAZOLAM 0.5 MG/1
0.5 TABLET ORAL 3 TIMES DAILY PRN
Qty: 90 TABLET | Refills: 1 | Status: SHIPPED | OUTPATIENT
Start: 2021-02-15 | End: 2021-07-16

## 2021-02-26 ENCOUNTER — OFFICE VISIT (OUTPATIENT)
Dept: FAMILY MEDICINE CLINIC | Facility: CLINIC | Age: 66
End: 2021-02-26

## 2021-02-26 VITALS
HEIGHT: 68 IN | HEART RATE: 77 BPM | BODY MASS INDEX: 26.37 KG/M2 | TEMPERATURE: 97.5 F | OXYGEN SATURATION: 99 % | SYSTOLIC BLOOD PRESSURE: 120 MMHG | DIASTOLIC BLOOD PRESSURE: 70 MMHG | RESPIRATION RATE: 20 BRPM | WEIGHT: 174 LBS

## 2021-02-26 DIAGNOSIS — F41.1 GENERALIZED ANXIETY DISORDER: ICD-10-CM

## 2021-02-26 DIAGNOSIS — E87.1 HYPONATREMIA: ICD-10-CM

## 2021-02-26 DIAGNOSIS — Z11.59 ENCOUNTER FOR HEPATITIS C SCREENING TEST FOR LOW RISK PATIENT: ICD-10-CM

## 2021-02-26 DIAGNOSIS — R91.1 PULMONARY NODULE: ICD-10-CM

## 2021-02-26 DIAGNOSIS — Z12.5 PROSTATE CANCER SCREENING: ICD-10-CM

## 2021-02-26 DIAGNOSIS — Z79.899 LONG-TERM USE OF HIGH-RISK MEDICATION: ICD-10-CM

## 2021-02-26 DIAGNOSIS — I10 ESSENTIAL HYPERTENSION: ICD-10-CM

## 2021-02-26 DIAGNOSIS — Z87.891 FORMER SMOKER: ICD-10-CM

## 2021-02-26 DIAGNOSIS — Z00.00 WELCOME TO MEDICARE PREVENTIVE VISIT: Primary | ICD-10-CM

## 2021-02-26 LAB
ALBUMIN SERPL-MCNC: 4.4 G/DL (ref 3.5–5.2)
ALBUMIN/GLOB SERPL: 2 G/DL
ALP SERPL-CCNC: 85 U/L (ref 39–117)
ALT SERPL W P-5'-P-CCNC: 17 U/L (ref 1–41)
ANION GAP SERPL CALCULATED.3IONS-SCNC: 10.2 MMOL/L (ref 5–15)
AST SERPL-CCNC: 22 U/L (ref 1–40)
BACTERIA UR QL AUTO: NORMAL /HPF
BILIRUB SERPL-MCNC: 0.4 MG/DL (ref 0–1.2)
BILIRUB UR QL STRIP: NEGATIVE
BUN SERPL-MCNC: 15 MG/DL (ref 8–23)
BUN/CREAT SERPL: 19.2 (ref 7–25)
CALCIUM SPEC-SCNC: 8.6 MG/DL (ref 8.6–10.5)
CHLORIDE SERPL-SCNC: 103 MMOL/L (ref 98–107)
CHOLEST SERPL-MCNC: 196 MG/DL (ref 0–200)
CLARITY UR: CLEAR
CO2 SERPL-SCNC: 26.8 MMOL/L (ref 22–29)
COLOR UR: YELLOW
CREAT SERPL-MCNC: 0.78 MG/DL (ref 0.76–1.27)
ERYTHROCYTE [DISTWIDTH] IN BLOOD BY AUTOMATED COUNT: 13.2 % (ref 12.3–15.4)
GFR SERPL CREATININE-BSD FRML MDRD: 100 ML/MIN/1.73
GLOBULIN UR ELPH-MCNC: 2.2 GM/DL
GLUCOSE SERPL-MCNC: 87 MG/DL (ref 65–99)
GLUCOSE UR STRIP-MCNC: NEGATIVE MG/DL
HCT VFR BLD AUTO: 42.2 % (ref 37.5–51)
HCV AB SER DONR QL: NORMAL
HDLC SERPL-MCNC: 34 MG/DL (ref 40–60)
HGB BLD-MCNC: 13.8 G/DL (ref 13–17.7)
HGB UR QL STRIP.AUTO: NEGATIVE
KETONES UR QL STRIP: NEGATIVE
LDLC SERPL CALC-MCNC: 139 MG/DL (ref 0–100)
LDLC/HDLC SERPL: 4.02 {RATIO}
LEUKOCYTE ESTERASE UR QL STRIP.AUTO: NEGATIVE
LYMPHOCYTES # BLD AUTO: 3.1 10*3/MM3 (ref 0.7–3.1)
LYMPHOCYTES NFR BLD AUTO: 48.9 % (ref 19.6–45.3)
MCH RBC QN AUTO: 30.4 PG (ref 26.6–33)
MCHC RBC AUTO-ENTMCNC: 32.8 G/DL (ref 31.5–35.7)
MCV RBC AUTO: 92.5 FL (ref 79–97)
MONOCYTES # BLD AUTO: 0.5 10*3/MM3 (ref 0.1–0.9)
MONOCYTES NFR BLD AUTO: 7.4 % (ref 5–12)
NEUTROPHILS NFR BLD AUTO: 2.8 10*3/MM3 (ref 1.7–7)
NEUTROPHILS NFR BLD AUTO: 43.7 % (ref 42.7–76)
NITRITE UR QL STRIP: NEGATIVE
PH UR STRIP.AUTO: 6 [PH] (ref 4.6–8)
PLATELET # BLD AUTO: 235 10*3/MM3 (ref 140–450)
PMV BLD AUTO: 8.1 FL (ref 6–12)
POTASSIUM SERPL-SCNC: 4.8 MMOL/L (ref 3.5–5.2)
PROT SERPL-MCNC: 6.6 G/DL (ref 6–8.5)
PROT UR QL STRIP: NEGATIVE
PSA SERPL-MCNC: 1.1 NG/ML (ref 0–4)
RBC # BLD AUTO: 4.56 10*6/MM3 (ref 4.14–5.8)
RBC # UR: NORMAL /HPF
REF LAB TEST METHOD: NORMAL
SODIUM SERPL-SCNC: 140 MMOL/L (ref 136–145)
SP GR UR STRIP: >=1.03 (ref 1–1.03)
SQUAMOUS #/AREA URNS HPF: NORMAL /HPF
TRIGL SERPL-MCNC: 126 MG/DL (ref 0–150)
TSH SERPL DL<=0.05 MIU/L-ACNC: 0.87 UIU/ML (ref 0.27–4.2)
UROBILINOGEN UR QL STRIP: NORMAL
VLDLC SERPL-MCNC: 23 MG/DL (ref 5–40)
WBC # BLD AUTO: 6.3 10*3/MM3 (ref 3.4–10.8)
WBC UR QL AUTO: NORMAL /HPF

## 2021-02-26 PROCEDURE — G0103 PSA SCREENING: HCPCS | Performed by: NURSE PRACTITIONER

## 2021-02-26 PROCEDURE — 99214 OFFICE O/P EST MOD 30 MIN: CPT | Performed by: NURSE PRACTITIONER

## 2021-02-26 PROCEDURE — 80053 COMPREHEN METABOLIC PANEL: CPT | Performed by: NURSE PRACTITIONER

## 2021-02-26 PROCEDURE — 80061 LIPID PANEL: CPT | Performed by: NURSE PRACTITIONER

## 2021-02-26 PROCEDURE — 81001 URINALYSIS AUTO W/SCOPE: CPT | Performed by: NURSE PRACTITIONER

## 2021-02-26 PROCEDURE — 84443 ASSAY THYROID STIM HORMONE: CPT | Performed by: NURSE PRACTITIONER

## 2021-02-26 PROCEDURE — G0402 INITIAL PREVENTIVE EXAM: HCPCS | Performed by: NURSE PRACTITIONER

## 2021-02-26 PROCEDURE — 85025 COMPLETE CBC W/AUTO DIFF WBC: CPT | Performed by: NURSE PRACTITIONER

## 2021-02-26 PROCEDURE — 86803 HEPATITIS C AB TEST: CPT | Performed by: NURSE PRACTITIONER

## 2021-02-26 PROCEDURE — 36415 COLL VENOUS BLD VENIPUNCTURE: CPT | Performed by: NURSE PRACTITIONER

## 2021-02-26 RX ORDER — FLUOXETINE HYDROCHLORIDE 40 MG/1
40 CAPSULE ORAL DAILY
Qty: 90 CAPSULE | Refills: 3 | Status: SHIPPED | OUTPATIENT
Start: 2021-02-26 | End: 2021-12-09 | Stop reason: DRUGHIGH

## 2021-02-26 RX ORDER — LISINOPRIL 10 MG/1
10 TABLET ORAL DAILY
Qty: 90 TABLET | Refills: 3 | Status: SHIPPED | OUTPATIENT
Start: 2021-02-26 | End: 2021-04-07 | Stop reason: SDUPTHER

## 2021-02-26 NOTE — PROGRESS NOTES
Chief Complaint  Establish Care, Hypertension, and Anxiety    Subjective          Maximus Bhardwaj Sr. 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 son in town, with chronic well controlled HTN on amlodipine 5 mg and lisinopril 10 mg daily no CP dizziness HA LE edema, regularly exercises 5 days weekly sees cardiology Dr Mo at [facility], sees Dr Paige monitoring pulm nodules, former smoker 40 years, no SOA wheezing or coughing, last colonoscopy 5 years ago Dr Denny BURLESON 10 year, no bowel or urin sx, no recent PSA, with generalized anxiety on fluoxetine 20 mg daily and alprazolam 0.5 mg TID x 1 year started d/t chest pain and panic, states prev took xanax in 90s and tolerated    Review of Systems   Constitutional: Negative for fatigue and fever.   Respiratory: Negative for cough, shortness of breath and wheezing.    Cardiovascular: Negative for chest pain, palpitations and leg swelling.   Gastrointestinal: Negative for abdominal distention, abdominal pain, anal bleeding, blood in stool, constipation, diarrhea, nausea, rectal pain and vomiting.   Genitourinary: Negative for decreased urine volume, difficulty urinating, discharge, dysuria, enuresis, flank pain, frequency, genital sores, hematuria, penile pain, penile swelling, scrotal swelling, testicular pain and urgency.   Neurological: Negative for dizziness and confusion.   Psychiatric/Behavioral: Negative for agitation, behavioral problems, decreased concentration, dysphoric mood, hallucinations, self-injury, sleep disturbance and suicidal ideas. The patient is nervous/anxious. The patient is not hyperactive.    All other systems reviewed and are negative.    Objective   Vital Signs:   /70 (BP Location: Left arm, Patient Position: Sitting)   Pulse 77   Temp 97.5 °F (36.4 °C) (Infrared)   Resp 20   Ht  "172.7 cm (68\")   Wt 78.9 kg (174 lb)   SpO2 99%   BMI 26.46 kg/m²     Physical Exam  Vitals signs reviewed.   Constitutional:       Appearance: He is well-developed.   HENT:      Head: Normocephalic and atraumatic.   Eyes:      Conjunctiva/sclera: Conjunctivae normal.      Pupils: Pupils are equal, round, and reactive to light.   Neck:      Musculoskeletal: Normal range of motion.   Cardiovascular:      Rate and Rhythm: Normal rate and regular rhythm.      Pulses: Normal pulses.      Heart sounds: Normal heart sounds.   Pulmonary:      Effort: Pulmonary effort is normal.      Breath sounds: Normal breath sounds.   Abdominal:      General: There is no distension.      Palpations: Abdomen is soft. There is no mass.      Tenderness: There is no abdominal tenderness. There is no right CVA tenderness, left CVA tenderness, guarding or rebound.      Hernia: No hernia is present.   Musculoskeletal: Normal range of motion.      Right lower leg: No edema.      Left lower leg: No edema.   Skin:     General: Skin is warm and dry.   Neurological:      Mental Status: He is alert and oriented to person, place, and time.   Psychiatric:         Mood and Affect: Mood normal.         Behavior: Behavior normal.         Thought Content: Thought content normal.         Judgment: Judgment normal.        Result Review :                 Assessment and Plan    Diagnoses and all orders for this visit:    1. Welcome to Medicare preventive visit (Primary)    2. Essential hypertension  -     CBC & Differential  -     Comprehensive Metabolic Panel  -     Lipid Panel  -     TSH  -     Urinalysis With Microscopic - Urine, Clean Catch  -     CBC Auto Differential  -     Urinalysis without microscopic (no culture) - Urine, Clean Catch  -     Urinalysis, Microscopic Only - Urine, Clean Catch    3. Generalized anxiety disorder  -     Compliance Drug Analysis, Ur - Urine, Clean Catch    4. Pulmonary nodule    5. Former smoker    6. Long-term use of " high-risk medication  -     Compliance Drug Analysis, Ur - Urine, Clean Catch    7. Prostate cancer screening  -     PSA Screen    8. Encounter for hepatitis C screening test for low risk patient  -     Hepatitis C Antibody    9. Hyponatremia  -     Cancel: Basic Metabolic Panel    Other orders  -     FLUoxetine (PROzac) 40 MG capsule; Take 1 capsule by mouth Daily.  Dispense: 90 capsule; Refill: 3  -     lisinopril (PRINIVIL,ZESTRIL) 10 MG tablet; Take 1 tablet by mouth Daily. FOR BLOOD PRESSURE  Dispense: 90 tablet; Refill: 3        Follow Up   Return in about 4 weeks (around 3/26/2021).  Patient was given instructions and counseling regarding his condition or for health maintenance advice. Please see specific information pulled into the AVS if appropriate.   Reviewed prev PCP records  Medicare wellness today, check labs and call with results, refill chronic dz meds check  BP at home, cont FU with cardiology, increase fluoxetine 40 mg daily and decrease xanax 0.5 mg BID and montior anxiety, discussed would not continue long term use of benzos discussed SE and complications, will continue to taper or pt will FU with psych, The patient has read and signed the [facility] Controlled Substance Contract UTD today, UDS today, ramakrishna reviewed.  I will continue to see patient for regular follow up appointments.  They are well controlled on their medication.  RAMAKRISHNA is updated every 3 months. The patient is aware of the potential for addiction and dependence.

## 2021-02-26 NOTE — PATIENT INSTRUCTIONS
Medicare wellness today, check labs and call with results, refill chronic dz meds check  BP at home, cont FU with cardiology, increase fluoxetine 40 mg daily and decrease xanax 0.5 mg BID and montior anxiety, discussed would not continue long term use of benzos discussed SE and complications, will continue to taper or pt will FU with psych, The patient has read and signed the [facility] Controlled Substance Contract UTD today, UDS today, ramakrishna reviewed.  I will continue to see patient for regular follow up appointments.  They are well controlled on their medication.  RAMAKRISHNA is updated every 3 months. The patient is aware of the potential for addiction and dependence.  Medicare Wellness  Personal Prevention Plan of Service     Date of Office Visit:  2021  Encounter Provider:  TERI Rodriguez  Place of Service:  [facility]  Patient Name: Maximus Bhardwaj Sr.  :  1955    As part of the Medicare Wellness portion of your visit today, we are providing you with this personalized preventive plan of services (PPPS). This plan is based upon recommendations of the [place] Preventive Services Task Force (USPSTF) and the Advisory Committee on Immunization Practices (ACIP).    This lists the preventive care services that should be considered, and provides dates of when you are due. Items listed as completed are up-to-date and do not require any further intervention.    Health Maintenance   Topic Date Due   • ZOSTER VACCINE (1 of 2) 2005   • HEPATITIS C SCREENING  2017   • ANNUAL WELLNESS VISIT  2017   • Pneumococcal Vaccine 65+ (1 of 1 - PPSV23) 2020   • AAA SCREEN (ONE-TIME)  07/15/2020   • INFLUENZA VACCINE  2020   • COLONOSCOPY  2025   • TDAP/TD VACCINES (2 - Td) 2025   • MENINGOCOCCAL VACCINE  Aged Out       Orders Placed This Encounter   Procedures   • Compliance Drug Analysis, Ur - Urine, Clean Catch   • Comprehensive  Metabolic Panel   • Lipid Panel   • TSH   • PSA Screen   • Hepatitis C Antibody   • CBC Auto Differential   • Urinalysis without microscopic (no culture) - Urine, Clean Catch   • Urinalysis, Microscopic Only - Urine, Clean Catch   • CBC & Differential     Order Specific Question:   Manual Differential     Answer:   No   • Urinalysis With Microscopic - Urine, Clean Catch       Return in about 4 weeks (around 3/26/2021).        Advance Care Planning and Advance Directives     You make decisions on a daily basis - decisions about where you want to live, your career, your home, your life. Perhaps one of the most important decisions you face is your choice for future medical care. Take time to talk with your family and your healthcare team and start planning today.  Advance Care Planning is a process that can help you:  · Understand possible future healthcare decisions in light of your own experiences  · Reflect on those decision in light of your goals and values  · Discuss your decisions with those closest to you and the healthcare professionals that care for you  · Make a plan by creating a document that reflects your wishes    Surrogate Decision Maker  In the event of a medical emergency, which has left you unable to communicate or to make your own decisions, you would need someone to make decisions for you.  It is important to discuss your preferences for medical treatment with this person while you are in good health.     Qualities of a surrogate decision maker:  • Willing to take on this role and responsibility  • Knows what you want for future medical care  • Willing to follow your wishes even if they don't agree with them  • Able to make difficult medical decisions under stressful circumstances    Advance Directives  These are legal documents you can create that will guide your healthcare team and decision maker(s) when needed. These documents can be stored in the electronic medical record.    · Living Will - a  legal document to guide your care if you have a terminal condition or a serious illness and are unable to communicate. States vary by statute in document names/types, but most forms may include one or more of the following:        -  Directions regarding life-prolonging treatments        -  Directions regarding artificially provided nutrition/hydration        -  Choosing a healthcare decision maker        -  Direction regarding organ/tissue donation    · Durable Power of  for Healthcare - this document names an -in-fact to make medical decisions for you, but it may also allow this person to make personal and financial decisions for you. Please seek the advice of an  if you need this type of document.    **Advance Directives are not required and no one may discriminate against you if you do not sign one.    Medical Orders  Many states allow specific forms/orders signed by your physician to record your wishes for medical treatment in your current state of health. This form, signed in personal communication with your physician, addresses resuscitation and other medical interventions that you may or may not want.      For more information or to schedule a time with a [facility] Advance Care Planning Facilitator contact: [email or web address] or call [phone] and someone will contact you directly.

## 2021-02-26 NOTE — PROGRESS NOTES
The ABCs of the Annual Wellness Visit  Welcome to Medicare Visit    Chief Complaint   Patient presents with   • Establish Care   • Hypertension   • Anxiety     Subjective   History of Present Illness:  Maximus Bhardwaj Sr. is a 65 y.o. male who presents for a  Welcome to Medicare Visit.    HEALTH RISK ASSESSMENT    Recent Hospitalizations:  Recently treated at the following:  Other: [facility] 08/20    Current Medical Providers:  Patient Care Team:  Gladys Laughlin APRN as PCP - General (Family Medicine)  Luis Paige MD as Consulting Physician (Pulmonary Disease)  Jerome Baldwin MD as Consulting Physician (Gastroenterology)  Gage Mo MD as Consulting Physician (Cardiology)    Smoking Status:  Social History     Tobacco Use   Smoking Status Former Smoker   • Packs/day: 2.00   • Years: 46.00   • Pack years: 92.00   • Types: Electronic Cigarette   Smokeless Tobacco Never Used   Tobacco Comment    not interested  quit w hypnosis for a while     Alcohol Consumption:  Social History     Substance and Sexual Activity   Alcohol Use Yes    Comment: PINT ON WEEKENDS     Depression Screen:   PHQ-2/PHQ-9 Depression Screening 2/26/2021   Little interest or pleasure in doing things 0   Feeling down, depressed, or hopeless 0   Trouble falling or staying asleep, or sleeping too much 0   Feeling tired or having little energy 0   Poor appetite or overeating 0   Feeling bad about yourself - or that you are a failure or have let yourself or your family down 0   Trouble concentrating on things, such as reading the newspaper or watching television 0   Moving or speaking so slowly that other people could have noticed. Or the opposite - being so fidgety or restless that you have been moving around a lot more than usual 0   Thoughts that you would be better off dead, or of hurting yourself in some way 0   Total Score 0   If you checked off any problems, how difficult have these problems made it for you to do your  work, take care of things at home, or get along with other people? Not difficult at all     Fall Risk Screen:  JACKIE Fall Risk Assessment was completed, and patient is at LOW risk for falls.Assessment completed on:2/26/2021    Health Habits and Functional and Cognitive Screening:  Functional & Cognitive Status 2/26/2021   Do you have difficulty preparing food and eating? No   Do you have difficulty bathing yourself, getting dressed or grooming yourself? No   Do you have difficulty using the toilet? No   Do you have difficulty moving around from place to place? No   Do you have trouble with steps or getting out of a bed or a chair? No   Current Diet Well Balanced Diet   Dental Exam Not up to date   Eye Exam Up to date   Exercise (times per week) 5 times per week   Current Exercises Include Walking   Do you need help using the phone?  No   Are you deaf or do you have serious difficulty hearing?  No   Do you need help with transportation? No   Do you need help shopping? No   Do you need help preparing meals?  No   Do you need help with housework?  No   Do you need help with laundry? No   Do you need help taking your medications? No   Do you need help managing money? No   Do you ever drive or ride in a car without wearing a seat belt? No   Have you felt unusual stress, anger or loneliness in the last month? No   Who do you live with? Alone   If you need help, do you have trouble finding someone available to you? No   Have you been bothered in the last four weeks by sexual problems? No   Do you have difficulty concentrating, remembering or making decisions? No     Does the patient have evidence of cognitive impairment? No    Asprin use counseling:Does not need ASA (and currently is not on it)    Visual Acuity:    No exam data present    Age-appropriate Screening Schedule:  Refer to the list below for future screening recommendations based on patient's age, sex and/or medical conditions. Orders for these recommended  tests are listed in the plan section. The patient has been provided with a written plan.    Health Maintenance   Topic Date Due   • ZOSTER VACCINE (1 of 2) 07/11/2005   • INFLUENZA VACCINE  08/01/2020   • COLONOSCOPY  08/25/2025   • TDAP/TD VACCINES (2 - Td) 12/03/2025          The following portions of the patient's history were reviewed and updated as appropriate: allergies, current medications, past family history, past medical history, past social history, past surgical history and problem list.    Outpatient Medications Prior to Visit   Medication Sig Dispense Refill   • ALPRAZolam (XANAX) 0.5 MG tablet Take 1 tablet by mouth 3 (Three) Times a Day As Needed for Anxiety. for anxiety 90 tablet 1   • amLODIPine (NORVASC) 5 MG tablet      • FLUoxetine (PROzac) 20 MG capsule TAKE ONE CAPSULE BY MOUTH DAILY 90 capsule 1   • lisinopril (PRINIVIL,ZESTRIL) 10 MG tablet TAKE ONE TABLET BY MOUTH DAILY FOR BLOOD PRESSURE 30 tablet 5   • albuterol (PROVENTIL HFA;VENTOLIN HFA) 108 (90 Base) MCG/ACT inhaler Inhale 2 puffs Every 4 (Four) Hours As Needed for Wheezing. 2 puffs every 4 hours when necessary dyspnea 1 inhaler 0     No facility-administered medications prior to visit.        Patient Active Problem List   Diagnosis   • Essential hypertension   • Generalized anxiety disorder   • Former smoker   • Pulmonary nodule       Advanced Care Planning:  ACP discussion was held with the patient during this visit. Patient does not have an advance directive, information provided.    Review of Systems    Compared to one year ago, the patient feels his physical health is better.  Compared to one year ago, the patient feels his mental health is better.    Reviewed chart for potential of high risk medication in the elderly: yes  Reviewed chart for potential of harmful drug interactions in the elderly:yes    Objective         Vitals:    02/26/21 0855   BP: 120/70   BP Location: Left arm   Patient Position: Sitting   Pulse: 77   Resp:  "20   Temp: 97.5 °F (36.4 °C)   TempSrc: Infrared   SpO2: 99%   Weight: 78.9 kg (174 lb)   Height: 172.7 cm (68\")   PainSc: 0-No pain       Body mass index is 26.46 kg/m².  Discussed the patient's BMI with him. The BMI is above average; BMI management plan is completed.    Physical Exam          Procedures    Assessment/Plan   Medicare Risks and Personalized Health Plan  CMS Preventative Services Quick Reference  Advance Directive Discussion  Cardiovascular risk  Prostate Cancer Screening     The above risks/problems have been discussed with the patient.  Pertinent information has been shared with the patient in the After Visit Summary.  Follow up plans and orders are seen below in the Assessment/Plan Section.    Diagnoses and all orders for this visit:    1. Welcome to Medicare preventive visit (Primary)    2. Essential hypertension  -     CBC & Differential  -     Comprehensive Metabolic Panel  -     Lipid Panel  -     TSH  -     Urinalysis With Microscopic - Urine, Clean Catch    3. Generalized anxiety disorder  -     Compliance Drug Analysis, Ur - Urine, Clean Catch    4. Pulmonary nodule    5. Former smoker    6. Long-term use of high-risk medication  -     Compliance Drug Analysis, Ur - Urine, Clean Catch    7. Prostate cancer screening  -     PSA Screen    8. Encounter for hepatitis C screening test for low risk patient  -     Hepatitis C Antibody    Other orders  -     FLUoxetine (PROzac) 40 MG capsule; Take 1 capsule by mouth Daily.  Dispense: 90 capsule; Refill: 3  -     lisinopril (PRINIVIL,ZESTRIL) 10 MG tablet; Take 1 tablet by mouth Daily. FOR BLOOD PRESSURE  Dispense: 90 tablet; Refill: 3        Follow Up:  Return in about 4 weeks (around 3/26/2021).     An After Visit Summary and PPPS were given to the patient.         "

## 2021-03-03 LAB — DRUGS UR: NORMAL

## 2021-03-05 RX ORDER — ATORVASTATIN CALCIUM 10 MG/1
10 TABLET, FILM COATED ORAL NIGHTLY
Qty: 30 TABLET | Refills: 5 | Status: SHIPPED | OUTPATIENT
Start: 2021-03-05 | End: 2021-04-28 | Stop reason: SINTOL

## 2021-03-26 ENCOUNTER — TELEPHONE (OUTPATIENT)
Dept: FAMILY MEDICINE CLINIC | Facility: CLINIC | Age: 66
End: 2021-03-26

## 2021-03-26 NOTE — TELEPHONE ENCOUNTER
DELETE AFTER REVIEWING: Telephone encounter to be sent to the clinical pool     Caller: Maximus Bhardwaj Sr.    Relationship: Self    Best call back number: [phone]  What medications are you currently taking:   Current Outpatient Medications on File Prior to Visit   Medication Sig Dispense Refill   • albuterol (PROVENTIL HFA;VENTOLIN HFA) 108 (90 Base) MCG/ACT inhaler Inhale 2 puffs Every 4 (Four) Hours As Needed for Wheezing. 2 puffs every 4 hours when necessary dyspnea 1 inhaler 0   • ALPRAZolam (XANAX) 0.5 MG tablet Take 1 tablet by mouth 3 (Three) Times a Day As Needed for Anxiety. for anxiety 90 tablet 1   • amLODIPine (NORVASC) 5 MG tablet      • atorvastatin (Lipitor) 10 MG tablet Take 1 tablet by mouth Every Night. 30 tablet 5   • FLUoxetine (PROzac) 40 MG capsule Take 1 capsule by mouth Daily. 90 capsule 3   • lisinopril (PRINIVIL,ZESTRIL) 10 MG tablet Take 1 tablet by mouth Daily. FOR BLOOD PRESSURE 90 tablet 3     No current facility-administered medications on file prior to visit.      Which medication are you concerned about: COLESTORAL MEDICATION , HE Has cut is xanax down to 1 1/2 a day     Who prescribed you this medication: DEMETRIUS ZUÑIGA    What are your concerns: AT NIGHT IT IS MAKING THE PATIENT RACE     How long have you been taking these medications: 3 WEEKS , HAS SINCE STOPPED TAKING IT  AS OF 03/23  How long have you had these concerns:  A FEW DAYS AGO

## 2021-03-26 NOTE — TELEPHONE ENCOUNTER
Pt informed- he stopped it a couple days ago and it has gotten better, he will call next week with an update

## 2021-03-26 NOTE — TELEPHONE ENCOUNTER
I think he is concerned new chol medication is causing SE. He can stop and monitor over the weekend and call back next week with update

## 2021-04-07 RX ORDER — LISINOPRIL 10 MG/1
10 TABLET ORAL DAILY
Qty: 90 TABLET | Refills: 0 | Status: SHIPPED | OUTPATIENT
Start: 2021-04-07 | End: 2022-06-28

## 2021-04-14 ENCOUNTER — TELEPHONE (OUTPATIENT)
Dept: FAMILY MEDICINE CLINIC | Facility: CLINIC | Age: 66
End: 2021-04-14

## 2021-04-14 NOTE — TELEPHONE ENCOUNTER
Televisit scheduled, he is also [facility] for 04/28/21. Should I leave that appointment in there??

## 2021-04-14 NOTE — TELEPHONE ENCOUNTER
Caller: Maximus Bhardwaj Sr.    Relationship: Self    Best call back number: [phone]    What is the best time to reach you: ANYTIME    Who are you requesting to speak with (clinical staff, provider,  specific staff member): NURSE    What was the call regarding: PATIENT WENT TO ER ON 04/8 WITH RT SIDE PAIN AND NAUSEA. THEY DID A CT SCAN AND DISCOVERED BOWEL BLOCKAGE ON RT SIDE. PATIENT HAS BEEN TRYING TO SELF MEDICATE BUT IS NOT GETTING MUCH RELIEF. HE HAS USED MIRALAX, DULCOLAX AND MAGNESIUM CITRATE.    PATIENT SET UP APPT WITH DEMETRIUS FOR NEXT AVAILABLE 04/28 BUT WOULD LIKE SOMETHING CALLED IN IF POSSIBLE.    PLEASE CALL AND ADVISE.     Do you require a callback: YES    CONFIRMED PHARMACY:    [facility] [address] [place] [address] - [address] [place] [address] - [phone]  - [phone]   [phone]

## 2021-04-14 NOTE — TELEPHONE ENCOUNTER
Spoke with pt, informed caroline will do telehealth visit tomorrow during lunchtime  Can you add him to schedule

## 2021-04-15 ENCOUNTER — OFFICE VISIT (OUTPATIENT)
Dept: FAMILY MEDICINE CLINIC | Facility: CLINIC | Age: 66
End: 2021-04-15

## 2021-04-15 DIAGNOSIS — F41.1 GENERALIZED ANXIETY DISORDER: ICD-10-CM

## 2021-04-15 DIAGNOSIS — R11.0 NAUSEA: ICD-10-CM

## 2021-04-15 DIAGNOSIS — K59.00 CONSTIPATION, UNSPECIFIED CONSTIPATION TYPE: Primary | ICD-10-CM

## 2021-04-15 DIAGNOSIS — I10 ESSENTIAL HYPERTENSION: ICD-10-CM

## 2021-04-15 PROCEDURE — 99443 PR PHYS/QHP TELEPHONE EVALUATION 21-30 MIN: CPT | Performed by: NURSE PRACTITIONER

## 2021-04-15 RX ORDER — ONDANSETRON 4 MG/1
4 TABLET, FILM COATED ORAL EVERY 8 HOURS PRN
Qty: 21 TABLET | Refills: 0 | Status: SHIPPED | OUTPATIENT
Start: 2021-04-15 | End: 2021-04-28 | Stop reason: HOSPADM

## 2021-04-15 NOTE — PROGRESS NOTES
Chief Complaint  Constipation, Abdominal Pain, and Nausea    Subjective          Maximus Bhardwaj . presents to [facility] PRIMARY CARE  History of Present Illness  Here to [place] on [place] [facility] [place] [facility] [place] [facility] [place] 04/08/21 for RLQ abd pain beginning 04/03/21 concern for appendicitis CT abd pelvis showed constipation, tried OTC colace not much help, prev had episode > 1 year [place] [facility] [place] advised to take miralax and milk of magnesium without prev helped at that time but recently restarted and not much help, went back to ER last night but wasn't seen d/t long wait but nurse told to lie on left side with pillow last night and has since had 3 small BM today with some improvement, notes sx started after eating smoked sausage and states won't eat again, last colonoscopy 5 years ago Dr Denny BURLESON 10 year, now with nausea and poor appetite, with some flank pain but no urin pain, no abd pain, no blood in stool or tissue, no diarrhea, no vomiting  With chronic anxiety on prozac 40 mg daily increased and trying to taper xanax 0.5 mg 1/2 AM and 1 PO HS plans to decreased 1/2 PO HS  With chronic HTN on amlodipine 5 mg mg and lisinopril 10 mg daily no CP dizziness HA LE edema    Objective   Vital Signs:   There were no vitals taken for this visit.    Physical Exam  Vitals reviewed.   Constitutional:       Appearance: He is well-developed.   HENT:      Head: Normocephalic and atraumatic.   Pulmonary:      Effort: Pulmonary effort is normal.      Breath sounds: Normal breath sounds.   Musculoskeletal:         General: Normal range of motion.   Neurological:      Mental Status: He is alert and oriented to person, place, and time.   Psychiatric:         Mood and Affect: Mood normal.         Behavior: Behavior normal.         Thought Content: Thought content normal.         Judgment: Judgment normal.        Result Review :                 Assessment and Plan    Diagnoses and all orders for this visit:    1.  Constipation, unspecified constipation type (Primary)    2. Essential hypertension    3. Nausea    4. Generalized anxiety disorder    Other orders  -     ondansetron (Zofran) 4 MG tablet; Take 1 tablet by mouth Every 8 (Eight) Hours As Needed for Nausea.  Dispense: 21 tablet; Refill: 0      I spent 21 minutes caring for Maximus on this date of service. This time includes time spent by me in the following activities:preparing for the visit, reviewing tests, obtaining and/or reviewing a separately obtained history, performing a medically appropriate examination and/or evaluation , counseling and educating the patient/family/caregiver, ordering medications, tests, or procedures and documenting information in the medical record  Follow Up   No follow-ups on file.  Patient was given instructions and counseling regarding his condition or for health maintenance advice. Please see specific information pulled into the AVS if appropriate.   You have chosen to receive care through a telephone visit. Do you consent to use a telephone visit for your medical care today? Yes spent 21 min on phone with patient reviewed labs and CT abd performed at ER, will give sample amitiza 24 mcg 2 PO BID x 2 days and monitor, erx zofran 4 mg prn NV, pt to contact GI Dr Baldwin gave phone number may need updated colonoscopy concern for episodes of constipation, cont all chronic dz meds and check BP at home, cont taper xanax and monitor mood

## 2021-04-15 NOTE — PATIENT INSTRUCTIONS
You have chosen to receive care through a telephone visit. Do you consent to use a telephone visit for your medical care today? Yes spent 21 min on phone with patient reviewed labs and CT abd performed at ER, will give sample amitiza 24 mcg 2 PO BID x 2 days and monitor, erx zofran 4 mg prn NV, pt to contact GI Dr Baldwin gave phone number may need updated colonoscopy concern for episodes of constipation, cont all chronic dz meds and check BP at home, cont taper xanax and monitor mood

## 2021-04-23 ENCOUNTER — TELEPHONE (OUTPATIENT)
Dept: FAMILY MEDICINE CLINIC | Facility: CLINIC | Age: 66
End: 2021-04-23

## 2021-04-23 DIAGNOSIS — Z12.11 COLON CANCER SCREENING: Primary | ICD-10-CM

## 2021-04-23 NOTE — TELEPHONE ENCOUNTER
PATIENT CALLED AND STATES HE NEEDS A REFERRAL FOR COLONOSCOPY. HE WOULD LIKE IT TO GO TO [facility] FAX # [phone]    HE WANTS TO MAKE SURE IT WILL BE COVERED UNDER MEDICARE AND ANTH MEDICARE SUPPLEMENT.    CALL BACK NUMBER [phone]

## 2021-04-26 NOTE — TELEPHONE ENCOUNTER
Per patient he had last colonoscopy 5 years ago from Dr Baldwin and told to FU 10 years. I can place colon ca screening but he is not due and insurance may not cover. They may want diagnostic colonoscopy since he is having constipation

## 2021-04-27 ENCOUNTER — TELEPHONE (OUTPATIENT)
Dept: FAMILY MEDICINE CLINIC | Facility: CLINIC | Age: 66
End: 2021-04-27

## 2021-04-27 NOTE — TELEPHONE ENCOUNTER
PATIENT STATED THAT HE DID NOT CANCEL HIS APPOINTMENT TOMORROW.      PATIENT NEEDS TO SEE DEMETRIUS ZUÑIGA AS HE IS STILL IS: NAUSEAS, CONSTIPATED, SLIGHT PAIN IN RIGHT SIDE, MAKES HIMSELF EAT.  CANNOT FIND ANY PROBLEMS.  PATIENT FEELS HE MIGHT HAVE A PARTIAL BOWEL OBSTRUCTION.      WENT TO THE ER  A MONTH AGO.  WENT TO ANOTHER ER 10 DAYS AGO.      PLEASE TRY TO GET THIS PATIENT SCHEDULED TOMORROW.    CALL BACK #: [phone]

## 2021-04-27 NOTE — TELEPHONE ENCOUNTER
There is not much I can do in primary care but treat symptoms. I would recommend he call his GI to see if they can get him in for colonoscopy. Ok to offer same day apt tomorrow

## 2021-04-28 ENCOUNTER — OFFICE VISIT (OUTPATIENT)
Dept: FAMILY MEDICINE CLINIC | Facility: CLINIC | Age: 66
End: 2021-04-28

## 2021-04-28 VITALS
TEMPERATURE: 97.5 F | WEIGHT: 171 LBS | DIASTOLIC BLOOD PRESSURE: 74 MMHG | BODY MASS INDEX: 25.91 KG/M2 | HEART RATE: 88 BPM | HEIGHT: 68 IN | OXYGEN SATURATION: 97 % | SYSTOLIC BLOOD PRESSURE: 118 MMHG

## 2021-04-28 DIAGNOSIS — K59.00 CONSTIPATION, UNSPECIFIED CONSTIPATION TYPE: Primary | ICD-10-CM

## 2021-04-28 DIAGNOSIS — R63.4 WEIGHT LOSS: ICD-10-CM

## 2021-04-28 DIAGNOSIS — R63.0 POOR APPETITE: ICD-10-CM

## 2021-04-28 DIAGNOSIS — R53.83 LETHARGIC: ICD-10-CM

## 2021-04-28 DIAGNOSIS — R11.0 NAUSEA: ICD-10-CM

## 2021-04-28 PROCEDURE — 99213 OFFICE O/P EST LOW 20 MIN: CPT | Performed by: NURSE PRACTITIONER

## 2021-04-28 RX ORDER — ONDANSETRON 4 MG/1
4 TABLET, FILM COATED ORAL EVERY 8 HOURS PRN
Qty: 21 TABLET | Refills: 0 | Status: SHIPPED | OUTPATIENT
Start: 2021-04-28 | End: 2021-08-10

## 2021-04-28 NOTE — PROGRESS NOTES
"Chief Complaint  Constipation (REFERRAL FOR SCOPE), pain rt side, and Nausea    Subjective          Maximus Bhardwaj . presents to [facility] PRIMARY CARE  History of Present Illness  Here to [place] on chronic constipation worsening over the last month, with nausea, poor appetite, fatigue and RLQ lateral abd pain 2/10, prev seen [facility] ER eval 04/08/21 for RLQ abd pain beginning 04/03/21 concern for appendicitis CT abd pelvis showed constipation, tried OTC colace not much help, prev had episode > 1 year went to [facility] [place] and advised to take miralax and milk of magnesium prev helped but not helping now, tried laying on left side with pillow with some improvement, notes sx started after eating smoked sausage, last colonoscopy 5 years ago Dr Denny BURLESON 10 year but wants eval GI awaiting referral, tried sample amitiza without change, went back to ER [place] [facility] 04/17/21 had xray showing low stool burden and tried enema without much change, notes having some BM but not full evacuation and some blood with enema but otherwise no regular blood in stool or tissue, no diarrhea, no vomiting but request zofran refill, fam hx of MGM colon polyp, no fam hx of colon ca, IBS, IBD    Objective   Vital Signs:   /74   Pulse 88   Temp 97.5 °F (36.4 °C)   Ht 172.7 cm (68\")   Wt 77.6 kg (171 lb)   SpO2 97%   BMI 26.00 kg/m²     Physical Exam  Vitals reviewed.   Constitutional:       Appearance: He is well-developed.   HENT:      Head: Normocephalic and atraumatic.   Eyes:      Conjunctiva/sclera: Conjunctivae normal.      Pupils: Pupils are equal, round, and reactive to light.   Cardiovascular:      Rate and Rhythm: Normal rate and regular rhythm.      Pulses: Normal pulses.      Heart sounds: Normal heart sounds.   Pulmonary:      Effort: Pulmonary effort is normal.      Breath sounds: Normal breath sounds.   Abdominal:      General: There is no distension.      Palpations: Abdomen is soft. There " is no mass.      Tenderness: There is no abdominal tenderness. There is no right CVA tenderness, left CVA tenderness, guarding or rebound.      Hernia: No hernia is present.   Musculoskeletal:         General: Tenderness (R lateral trunk) present. Normal range of motion.      Cervical back: Normal range of motion.      Right lower leg: No edema.      Left lower leg: No edema.   Skin:     General: Skin is warm and dry.   Neurological:      Mental Status: He is alert and oriented to person, place, and time.   Psychiatric:         Mood and Affect: Mood normal.         Behavior: Behavior normal.         Thought Content: Thought content normal.         Judgment: Judgment normal.        Result Review :                 Assessment and Plan    Diagnoses and all orders for this visit:    1. Constipation, unspecified constipation type (Primary)  -     Ambulatory Referral to Gastroenterology    2. Nausea  -     Ambulatory Referral to Gastroenterology    3. Lethargic  -     Ambulatory Referral to Gastroenterology    4. Poor appetite  -     Ambulatory Referral to Gastroenterology    5. Weight loss  -     Ambulatory Referral to Gastroenterology    Other orders  -     ondansetron (Zofran) 4 MG tablet; Take 1 tablet by mouth Every 8 (Eight) Hours As Needed for Nausea.  Dispense: 21 tablet; Refill: 0        Follow Up   No follow-ups on file.  Patient was given instructions and counseling regarding his condition or for health maintenance advice. Please see specific information pulled into the AVS if appropriate.   Reviewed [place] [facility] records and imaging, awaiting consult GI for colonoscopy, again urgent GI consult placed and pt will drop forms today, refill zofran prn, if sx persist or worsen go to ER

## 2021-04-28 NOTE — PATIENT INSTRUCTIONS
Reviewed [place] [facility] records and imaging, awaiting consult GI for colonoscopy, again urgent GI consult placed and pt will drop forms today, refill zofran prn, if sx persist or worsen go to ER

## 2021-04-29 ENCOUNTER — TELEPHONE (OUTPATIENT)
Dept: GASTROENTEROLOGY | Facility: CLINIC | Age: 66
End: 2021-04-29

## 2021-04-29 NOTE — TELEPHONE ENCOUNTER
Last scope 08/26/2015-- no personal hx of polyps-- family hx of polyps-- no family hx of colon ca-- ASA-- medications:    ALPRAZolam (XANAX) 0.5 MG tablet  amLODIPine (NORVASC) 5 MG tablet  FLUoxetine (PROzac) 40 MG capsule  lisinopril (PRINIVIL,ZESTRIL) 10 MG tablet      OA form and last scope scanned into media

## 2021-05-24 ENCOUNTER — TELEPHONE (OUTPATIENT)
Dept: FAMILY MEDICINE CLINIC | Facility: CLINIC | Age: 66
End: 2021-05-24

## 2021-05-24 NOTE — TELEPHONE ENCOUNTER
Caller: Maximus Bhardwaj Sr.    Relationship to patient: Self    Best call back number: [phone]    Patient is needing: PATIENT CALLED IN STATING THAT HE NEEDS A NOTE FOR THE [facility]  THAT STATES HE IS ON MEDICATIONS FOR ANXIETY AND HIGH BLOOD PRESSURE. PATIENT ALSO STATED THAT HE NEEDED TO CANCEL HIS COLONOSCOPY APPOINTMENT AS HE HAS MADE ANOTHER APPOINTMENT WITH A DIFFERENT PROVIDER FOR 6/8/2021. PATIENT SAID HE WILL  NOTE WHEN IT IS READY. PLEASE CALL PATIENT AND ADVISE.

## 2021-06-04 VITALS
HEART RATE: 73 BPM | DIASTOLIC BLOOD PRESSURE: 78 MMHG | SYSTOLIC BLOOD PRESSURE: 89 MMHG | DIASTOLIC BLOOD PRESSURE: 79 MMHG | DIASTOLIC BLOOD PRESSURE: 61 MMHG | DIASTOLIC BLOOD PRESSURE: 80 MMHG | HEART RATE: 84 BPM | TEMPERATURE: 98.1 F | OXYGEN SATURATION: 100 % | SYSTOLIC BLOOD PRESSURE: 154 MMHG | SYSTOLIC BLOOD PRESSURE: 101 MMHG | HEART RATE: 79 BPM | RESPIRATION RATE: 11 BRPM | HEIGHT: 68 IN | DIASTOLIC BLOOD PRESSURE: 112 MMHG | RESPIRATION RATE: 10 BRPM | RESPIRATION RATE: 20 BRPM | SYSTOLIC BLOOD PRESSURE: 91 MMHG | SYSTOLIC BLOOD PRESSURE: 119 MMHG | SYSTOLIC BLOOD PRESSURE: 87 MMHG | RESPIRATION RATE: 13 BRPM | TEMPERATURE: 97.5 F | SYSTOLIC BLOOD PRESSURE: 90 MMHG | HEART RATE: 78 BPM | RESPIRATION RATE: 12 BRPM | SYSTOLIC BLOOD PRESSURE: 137 MMHG | DIASTOLIC BLOOD PRESSURE: 59 MMHG | SYSTOLIC BLOOD PRESSURE: 126 MMHG | DIASTOLIC BLOOD PRESSURE: 67 MMHG | HEART RATE: 82 BPM | OXYGEN SATURATION: 95 % | RESPIRATION RATE: 16 BRPM | RESPIRATION RATE: 18 BRPM | OXYGEN SATURATION: 99 % | HEART RATE: 75 BPM | HEART RATE: 72 BPM | WEIGHT: 165 LBS | OXYGEN SATURATION: 98 % | HEART RATE: 80 BPM | DIASTOLIC BLOOD PRESSURE: 58 MMHG

## 2021-06-08 ENCOUNTER — AMBULATORY SURGICAL CENTER (AMBULATORY)
Dept: URBAN - METROPOLITAN AREA SURGERY 17 | Facility: SURGERY | Age: 66
End: 2021-06-08
Payer: MEDICARE

## 2021-06-08 DIAGNOSIS — K57.30 DIVERTICULOSIS OF LARGE INTESTINE WITHOUT PERFORATION OR ABS: ICD-10-CM

## 2021-06-08 DIAGNOSIS — Z12.11 ENCOUNTER FOR SCREENING FOR MALIGNANT NEOPLASM OF COLON: ICD-10-CM

## 2021-06-08 PROCEDURE — G0121 COLON CA SCRN NOT HI RSK IND: HCPCS | Performed by: INTERNAL MEDICINE

## 2021-06-28 ENCOUNTER — TELEPHONE (OUTPATIENT)
Dept: FAMILY MEDICINE CLINIC | Facility: CLINIC | Age: 66
End: 2021-06-28

## 2021-06-28 NOTE — TELEPHONE ENCOUNTER
PATIENT IS HAVING THE FOLLOWING:    SYMPTOMS:  NAUSEAS  LOSS OF APPETITE  LETHARGIC  NUMBNESS IN BACK OF RAIN (RIGHT SIDE) WHEN LAYING DOWN.    APPOINTMENT WITH JOSE MATTSON 7/9/21.    PLEASE ADVISE

## 2021-06-29 NOTE — TELEPHONE ENCOUNTER
Did he get colonoscopy? See he canceled [facility] but I think that is bc he had different MD to see

## 2021-07-09 ENCOUNTER — OFFICE VISIT (OUTPATIENT)
Dept: FAMILY MEDICINE CLINIC | Facility: CLINIC | Age: 66
End: 2021-07-09

## 2021-07-09 ENCOUNTER — HOSPITAL ENCOUNTER (EMERGENCY)
Facility: HOSPITAL | Age: 66
Discharge: HOME | End: 2021-07-09
Attending: EMERGENCY MEDICINE | Admitting: EMERGENCY MEDICINE

## 2021-07-09 ENCOUNTER — APPOINTMENT (OUTPATIENT)
Dept: CT IMAGING | Facility: HOSPITAL | Age: 66
End: 2021-07-09

## 2021-07-09 VITALS
OXYGEN SATURATION: 100 % | WEIGHT: 170 LBS | BODY MASS INDEX: 25.76 KG/M2 | SYSTOLIC BLOOD PRESSURE: 118 MMHG | TEMPERATURE: 97.7 F | HEIGHT: 68 IN | DIASTOLIC BLOOD PRESSURE: 68 MMHG | HEART RATE: 63 BPM

## 2021-07-09 VITALS
DIASTOLIC BLOOD PRESSURE: 76 MMHG | BODY MASS INDEX: 25.76 KG/M2 | SYSTOLIC BLOOD PRESSURE: 105 MMHG | RESPIRATION RATE: 18 BRPM | TEMPERATURE: 97.3 F | HEART RATE: 66 BPM | HEIGHT: 68 IN | OXYGEN SATURATION: 98 % | WEIGHT: 170 LBS

## 2021-07-09 DIAGNOSIS — M47.22 OSTEOARTHRITIS OF SPINE WITH RADICULOPATHY, CERVICAL REGION: Primary | ICD-10-CM

## 2021-07-09 DIAGNOSIS — R42 DIZZINESS: ICD-10-CM

## 2021-07-09 DIAGNOSIS — R11.0 NAUSEA: ICD-10-CM

## 2021-07-09 DIAGNOSIS — G44.52 NEW DAILY PERSISTENT HEADACHE: Primary | ICD-10-CM

## 2021-07-09 DIAGNOSIS — R53.83 FATIGUE, UNSPECIFIED TYPE: ICD-10-CM

## 2021-07-09 DIAGNOSIS — R20.0 NUMBNESS: ICD-10-CM

## 2021-07-09 DIAGNOSIS — D32.0 MENINGIOMA, CEREBRAL (HCC): ICD-10-CM

## 2021-07-09 PROCEDURE — 70450 CT HEAD/BRAIN W/O DYE: CPT

## 2021-07-09 PROCEDURE — 99282 EMERGENCY DEPT VISIT SF MDM: CPT

## 2021-07-09 PROCEDURE — 99213 OFFICE O/P EST LOW 20 MIN: CPT | Performed by: NURSE PRACTITIONER

## 2021-07-09 PROCEDURE — 72125 CT NECK SPINE W/O DYE: CPT

## 2021-07-09 RX ORDER — ATORVASTATIN CALCIUM 10 MG/1
TABLET, FILM COATED ORAL
COMMUNITY
Start: 2021-06-10 | End: 2021-12-09 | Stop reason: SDUPTHER

## 2021-07-09 RX ORDER — OMEPRAZOLE 20 MG/1
CAPSULE, DELAYED RELEASE ORAL
COMMUNITY
Start: 2021-06-29 | End: 2021-09-14

## 2021-07-09 NOTE — ED PROVIDER NOTES
EMERGENCY DEPARTMENT ENCOUNTER  Patient was placed in face mask in first look and the following protective measures were taken unless additional measures were taken and documented below in the ED course. Patient was wearing facemask when I entered the room and throughout our encounter. I wore full protective equipment throughout this patient encounter including a face mask, and gloves. Hand hygiene was performed before donning protective equipment and after removal when leaving the room.    Room Number:  36/36  Date of encounter:  7/9/2021  PCP: Gladys Laughlin APRN    HPI:  Context: Maximus Bhardwaj Sr. is a 65 y.o. male who presents to the ED c/o chief complaint of intermittent numbness of his occipital scalp for 6 weeks.  Patient states that whenever he lays on his couch, resting the back of his head against the arm of the couch, he develops a numbness sensation.  He states it is localized to his occipital scalp and does not radiate.  It is better with lifting his head off the edge of the couch and when he sits up.  He denies weakness or numbness of an arm or leg.  For the past 2 weeks, patient has noted a mild frontal headache that last for a few minutes and is relieved with Tylenol.  He denies fever, chills, sinus congestion or sore throat.  He denies chest pain or abdominal pain.  He saw his family doctor today who sent patient here to the emergency department for further evaluation and treatment.  Currently, patient has no complaints.  He denies weakness, numbness, headache or blurry vision.  He denies neck pain or headache at this time.  He denies history of migraines.    MEDICAL HISTORY REVIEW  Reviewed in [facility].  Patient was seen by TERI Monreal this morning who sent patient to the emergency department for further evaluation and treatment.    PAST MEDICAL HISTORY  Active Ambulatory Problems     Diagnosis Date Noted   • Essential hypertension 02/26/2021   • Generalized anxiety disorder 02/26/2021    • Former smoker 02/26/2021   • Pulmonary nodule 02/26/2021     Resolved Ambulatory Problems     Diagnosis Date Noted   • No Resolved Ambulatory Problems     Past Medical History:   Diagnosis Date   • Bladder tumor    • Sleep apnea        PAST SURGICAL HISTORY  Past Surgical History:   Procedure Laterality Date   • COLONOSCOPY  08/26/2015    Jerome Baldwin MD   • CYSTOSCOPY  08/19/2014    Roberto Smith M.D.       FAMILY HISTORY  Family History   Problem Relation Age of Onset   • Hypertension Mother    • Allergies Mother    • Heart disease Father 49   • Alcohol abuse Father    • Heart attack Father        SOCIAL HISTORY  Social History     Socioeconomic History   • Marital status:      Spouse name: Not on file   • Number of children: 1   • Years of education: Not on file   • Highest education level: Not on file   Tobacco Use   • Smoking status: Former Smoker     Packs/day: 2.00     Years: 46.00     Pack years: 92.00     Types: Electronic Cigarette   • Smokeless tobacco: Never Used   • Tobacco comment: not interested  quit w hypnosis for a while   Substance and Sexual Activity   • Alcohol use: Yes     Comment: PINT ON WEEKENDS       ALLERGIES  Atorvastatin    The patient's allergies have been reviewed    REVIEW OF SYSTEMS  All systems reviewed and negative except for those discussed in HPI.     PHYSICAL EXAM  I have reviewed the triage vital signs and nursing notes.  ED Triage Vitals   Temp Heart Rate Resp BP SpO2   07/09/21 0900 07/09/21 0900 07/09/21 0900 07/09/21 0914 07/09/21 0900   97.3 °F (36.3 °C) 93 18 133/79 99 %      Temp src Heart Rate Source Patient Position BP Location FiO2 (%)   07/09/21 0900 07/09/21 0900 -- -- --   Tympanic Monitor            General: No acute distress.  HENT: NCAT, PERRL, Nares patent.  Eyes: no scleral icterus.  Extraocular movements are intact.  Neck: trachea midline, no ROM limitations.  C-spine is nontender to palpation.  Bilateral trapezius muscles are nontender to  palpation.  CV: regular rhythm, regular rate.  Respiratory: normal effort, CTAB.  Abdomen: soft, nondistended, NTTP, no rebound tenderness, no guarding or rigidity  : deferred.  Musculoskeletal: no deformity.  Neuro: alert, moves all extremities, follows commands.  Please see stroke score.  Skin: warm, dry.    LAB RESULTS  No results found for this or any previous visit (from the past 24 hour(s)).    I ordered the above labs and reviewed the results.    RADIOLOGY  CT Head Without Contrast, CT Cervical Spine Without Contrast    Result Date: 7/9/2021  CT SCAN OF THE HEAD AND CERVICAL SPINE WITHOUT CONTRAST  CLINICAL HISTORY: Headache and neck pain.  TECHNIQUE: CT scan of the head was obtained with 3 mm axial images. No intravenous contrast was administered. Sagittal and coronal reconstructed images were obtained.  FINDINGS:  There is a small peripherally calcified extra-axial lesion along the superior aspect of the right frontal lobe which measures up to 6 x 7 mm in greatest axial dimensions and approximately 5 mm in greatest craniocaudad dimensions. This lesion is most compatible with a meningioma. Further characterization could be performed with MR imaging.  Otherwise, the gray-white matter differentiation is within normal limits. The ventricles, sulci, and cisterns are age appropriate. The basal ganglia and thalami are unremarkable. The posterior fossa structures are within normal limits.  Incidental note is made of a tiny mucous retention cyst within the left maxillary sinus. Patchy areas of opacification are appreciated within the ethmoids. There is a mucous retention cyst or fluid within the right aspect of the sphenoid sinus. Mild mucosal thickening is additionally appreciated within the frontal sinus. There is a question of a previous left partial ethmoidectomy.  Incidental note is made of prominent arthritic changes within the temporomandibular joints.  Incidental note is made of a partially calcified  sebaceous cyst within the left parietal scalp which measures up to 1.6 x 1.1 cm in greatest axial dimensions.       There is no CT evidence for acute intracranial pathology.  There are findings most likely representative of a small meningioma along the superior aspect of the lobe resulting in minimal mass effect. The lesion measures up to approximately 6 x 7 x 5 mm in greatest dimensions, could be further characterized with MR imaging.  Mild changes of inflammatory paranasal sinus disease are incidentally noted and discussed in detail above.   TECHNIQUE: CT scan of the cervical spine was obtained with 1 mm axial bone algorithm and 2 mm axial soft tissue algorithm images.  FINDINGS: At C2-3, there is no significant degree of canal or foraminal stenosis.  At C3-4, there is a disc osteophyte complex which results in a mild degree of canal stenosis. There is mild left and severe right foraminal narrowing secondary to uncovertebral joint hypertrophy.  At C4-5, there is a disc osteophyte complex eccentric to the left. Additionally, there are some ossification the posterior longitudinal ligament that contributes to the canal stenosis. A mild-to-moderate degree of canal narrowing is seen as a result of these features. There is severe left and moderate-to-severe right foraminal narrowing secondary to uncovertebral joint hypertrophy. Pronounced degenerative disc changes are identified at C4-5 with adjacent degenerative endplate sclerosis and irregularity.  At C5-6, there is a disc osteophyte complex which results in a mild degree of canal stenosis. Prominent degenerative disc change with adjacent degenerative endplate sclerosis and endplate irregularity. There is a moderate degree of left and a severe degree of right foraminal narrowing secondary to uncovertebral joint hypertrophy.  At C6-7, again there are degenerative disc changes with degenerative endplate sclerosis and irregularity. Mild degree of central canal stenosis  is identified secondary to a disc osteophyte complex. There is a mild degree of left foraminal narrowing secondary to uncovertebral joint hypertrophy.  At C7-T1, there is no significant degree of canal or foraminal stenosis.  IMPRESSION:  Multilevel degenerative phenomena are appreciated within the cervical spine resulting in canal and foraminal stenotic changes as discussed in detail above.  At C4-5 level, there is a disc osteophyte complex eccentric to the left in addition to some ossification of posterior longitudinal ligament that results in a mild-to-moderate degree of canal stenosis. Mild degrees of canal stenosis are identified at the C3-4, C5-6, and C6-7 levels.  Multilevel foraminal stenosis is identified and this includes a severe degree of right C3-4, a severe degree of left C4-5, moderate to severe degree of right C4-5, moderate degree of left C5-6, severe degree of right C5-6, and a mild degree of left C6-7 foraminal stenosis.  The findings of this report were discussed with Dr. Bui on 07/09/2021 at approximately 10:30 AM.   Radiation dose reduction techniques were utilized, including automated exposure control and exposure modulation based on body size.  This report was finalized on 7/9/2021 10:41 AM by Dr. Dillon Arnold M.D.        I ordered the above noted radiological studies. I reviewed the images and results. I agree with the radiologist interpretation.    PROCEDURES  Procedures  NIHSS: 0910    Baseline  0-->Alert: keenly responsive  0-->Answers both questions correctly  0-->Performs both tasks correctly  0=normal  0=No visual loss  0=Normal symmetric movement  0-->No drift: limb holds 90 (or 45) degrees for full 10 secs  0-->No drift: limb holds 90 (or 45) degrees for full 10 secs  0-->No drift: limb holds 90 (or 45) degrees for full 10 secs  0-->No drift: limb holds 90 (or 45) degrees for full 10 secs  0=Absent  0=Normal; no sensory loss  0=No aphasia, normal  0=Normal  0=No  abnormality    Total score: 0          MEDICATIONS GIVEN IN ER  Medications - No data to display    PROGRESS, DATA ANALYSIS, CONSULTS, AND MEDICAL DECISION MAKING  A complete history and physical exam have been performed.  All available laboratory and imaging results have been reviewed by myself prior to disposition.    MDM  After the initial H&P, I discussed pertinent information from history and physical exam with patient/family.  Discussed differential diagnosis.  Discussed plan for ED evaluation/work-up/treatment.  All questions answered.  Patient/family is agreeable with plan.  ED Course as of Jul 09 1731 Fri Jul 09, 2021   0920 Patient presents with intermittent localized numbness and tingling to his occipital scalp depending on position for the past 6 weeks.  He now has had intermittent headaches for the past 2 weeks.  Will obtain a CT the head and cervical spine to rule out mass or significant arthritis of his cervical spine.    [DE]   1032 I discussed the case with Dr. Arnold, radiology.  Patient CT of the head shows a 8 mm meningioma.  He recommends observation.  The CT of the cervical spine shows moderate degenerative changes throughout his cervical spine.    [DE]   1055 I have updated patient on the results of his CTs.  Advised patient that he will need a follow-up CT of the head and 3 to 6 months to monitor his meningioma.    [DE]      ED Course User Index  [DE] Jerome Bui MD       AS OF 17:31 EDT VITALS:    BP - 105/76  HR - 66  TEMP - 97.3 °F (36.3 °C) (Tympanic)  O2 SATS - 98%    DIAGNOSIS  Final diagnoses:   Osteoarthritis of spine with radiculopathy, cervical region   Meningioma, cerebral (CMS/[facility])         DISPOSITION    DISCHARGE    Patient discharged in stable condition.    Reviewed implications of results, diagnosis, meds, responsibility to follow up, warning signs and symptoms of possible worsening, potential complications and reasons to return to ER.    Patient/Family voiced  understanding of above instructions.    Discussed plan for discharge, as there is no emergent indication for admission. Patient referred to primary care provider for BP management due to today's BP. Pt/family is agreeable and understands need for follow up and repeat testing.  Pt is aware that discharge does not mean that nothing is wrong but it indicates no emergency is present that requires admission and they must continue care with follow-up as given below or physician of their choice.     FOLLOW-UP  Gladys Laughlin, APRN  [address] [address]  [phone]    Schedule an appointment as soon as possible for a visit            Medication List      No changes were made to your prescriptions during this visit.          Jerome Bui MD  07/09/21 8780

## 2021-07-09 NOTE — PROGRESS NOTES
"Chief Complaint  Numbness/pain back head (no blurred vision, no slurred speech)    Subjective          Maximus Bhardwaj Sr. presents to [facility]  History of Present Illness  C/o numbness and pain in back of head, denies fever, denies vision changes or speech changes. States symptoms started about 6 weeks ago. He had bowel obstruction in 4/2021, states BM now improved regular, he now feels lethargic and decreased appetite, still having nausea. He denies hx of migraines, he states HA frontal, denies hx of HA, he states when lying on cough he feels numb feeling on post head, improved with movement. He denies sinus pressure or ear pain. He denies photophobia, states he feels lightheadedness at times, espinal have dizziness at times. He states he does have weakness in right hand at times. He denies numbness in hands, denies hx of DM.       Objective   Vital Signs:   /68   Pulse 63   Temp 97.7 °F (36.5 °C)   Ht 172.7 cm (68\")   Wt 77.1 kg (170 lb)   SpO2 100%   BMI 25.85 kg/m²     Physical Exam  Vitals and nursing note reviewed.   Constitutional:       Appearance: He is well-developed.   HENT:      Head: Normocephalic.      Right Ear: Tympanic membrane and ear canal normal.      Left Ear: Tympanic membrane and ear canal normal.      Nose: Nose normal.   Eyes:      Pupils: Pupils are equal, round, and reactive to light.   Cardiovascular:      Rate and Rhythm: Normal rate and regular rhythm.      Pulses: Normal pulses.      Heart sounds: Normal heart sounds.   Pulmonary:      Effort: Pulmonary effort is normal.      Breath sounds: Normal breath sounds.   Musculoskeletal:      Cervical back: Full passive range of motion without pain and neck supple.      Right lower leg: No edema.      Left lower leg: No edema.   Skin:     General: Skin is warm and dry.   Neurological:      Mental Status: He is alert and oriented to person, place, and time.      Cranial Nerves: Cranial nerves are " intact.      Sensory: Sensation is intact.      Motor: Motor function is intact.      Coordination: Coordination is intact.      Gait: Gait is intact.   Psychiatric:         Behavior: Behavior normal.         Judgment: Judgment normal.        Result Review :                 Assessment and Plan    Diagnoses and all orders for this visit:    1. New daily persistent headache (Primary)    2. Dizziness    3. Fatigue, unspecified type    4. Nausea    5. Numbness        Follow Up   Return if symptoms worsen or fail to improve.  Patient was given instructions and counseling regarding his condition or for health maintenance advice. Please see specific information pulled into the AVS if appropriate.     Patient sent down to [facility] ER at this time for further evaluation and possible imaging,   Educated about s/s stroke, advised ER if any worsening symptoms.   Patient agrees with plan of care and understands instructions. Call if worsening symptoms or any problems or concerns.

## 2021-07-09 NOTE — PATIENT INSTRUCTIONS
Patient sent down to [facility] ER at this time for further evaluation and possible imaging,   Educated about s/s stroke, advised ER if any worsening symptoms.   Patient agrees with plan of care and understands instructions. Call if worsening symptoms or any problems or concerns.

## 2021-07-09 NOTE — DISCHARGE INSTRUCTIONS
Follow-up with your family doctor as needed.  You will need a repeat CT of the head in 3 to 6 months to see if the benign meningioma has changed in size.  If you develop tingling on the back of your head again, do adjust your sitting position.  That should relieve the discomfort.

## 2021-07-09 NOTE — ED NOTES
Patient was placed in face mask in first look.  Patient was wearing a face mask throughout our encounter.  I wore protective eye protection throughout the encounter.  Hand hygiene was performed before and after patient encounter.     Pt sent from PCP office due 6 weeks of intermittent numbness, fatigue, nausea, slight headaches.  Pt denies any pain right now.     Maximus Singleton RN  07/09/21 0901

## 2021-07-12 ENCOUNTER — TELEPHONE (OUTPATIENT)
Dept: FAMILY MEDICINE CLINIC | Facility: CLINIC | Age: 66
End: 2021-07-12

## 2021-07-12 NOTE — TELEPHONE ENCOUNTER
PATIENT WAS SEEN ON Friday AND WAS TOLD TO GO TO ER.   PATIENT WAS SEEN THERE, CT SCAN WAS PERFORMED.   PATIENT WAS TOLD HAS A BRAIN TUMOR.  WANTED TO KNOW WHERE TO GO FROM HERE.    PLEASE ADVISE    [phone]

## 2021-07-12 NOTE — TELEPHONE ENCOUNTER
His CT head shows small benign meningioma, small normal tumor, but they recommended follow up imaging in 3-6 months to make sure it does not change in size. Please make f/u appt in office to discuss.

## 2021-07-16 DIAGNOSIS — F41.9 ANXIETY: ICD-10-CM

## 2021-07-16 RX ORDER — ALPRAZOLAM 0.5 MG/1
TABLET ORAL
Qty: 90 TABLET | Refills: 0 | Status: SHIPPED | OUTPATIENT
Start: 2021-07-16 | End: 2021-09-15 | Stop reason: SDUPTHER

## 2021-07-27 ENCOUNTER — OFFICE VISIT (OUTPATIENT)
Dept: FAMILY MEDICINE CLINIC | Facility: CLINIC | Age: 66
End: 2021-07-27

## 2021-07-27 VITALS
RESPIRATION RATE: 18 BRPM | OXYGEN SATURATION: 98 % | TEMPERATURE: 97.7 F | BODY MASS INDEX: 25.91 KG/M2 | HEART RATE: 72 BPM | WEIGHT: 171 LBS | HEIGHT: 68 IN | DIASTOLIC BLOOD PRESSURE: 70 MMHG | SYSTOLIC BLOOD PRESSURE: 120 MMHG

## 2021-07-27 DIAGNOSIS — R93.0 ABNORMAL CT OF THE HEAD: ICD-10-CM

## 2021-07-27 DIAGNOSIS — M50.30 DDD (DEGENERATIVE DISC DISEASE), CERVICAL: ICD-10-CM

## 2021-07-27 DIAGNOSIS — R20.0 NUMBNESS: ICD-10-CM

## 2021-07-27 DIAGNOSIS — R53.83 FATIGUE, UNSPECIFIED TYPE: ICD-10-CM

## 2021-07-27 DIAGNOSIS — R40.0 DAYTIME SLEEPINESS: ICD-10-CM

## 2021-07-27 DIAGNOSIS — M48.02 CERVICAL STENOSIS OF SPINAL CANAL: ICD-10-CM

## 2021-07-27 DIAGNOSIS — E55.9 VITAMIN D DEFICIENCY, UNSPECIFIED: ICD-10-CM

## 2021-07-27 DIAGNOSIS — D32.9 MENINGIOMA (HCC): Primary | ICD-10-CM

## 2021-07-27 LAB
25(OH)D3 SERPL-MCNC: 40.8 NG/ML (ref 30–100)
ALBUMIN SERPL-MCNC: 4.1 G/DL (ref 3.5–5.2)
ALBUMIN/GLOB SERPL: 1.4 G/DL
ALP SERPL-CCNC: 91 U/L (ref 39–117)
ALT SERPL W P-5'-P-CCNC: 18 U/L (ref 1–41)
ANION GAP SERPL CALCULATED.3IONS-SCNC: 9.1 MMOL/L (ref 5–15)
AST SERPL-CCNC: 19 U/L (ref 1–40)
BILIRUB SERPL-MCNC: 0.3 MG/DL (ref 0–1.2)
BUN SERPL-MCNC: 17 MG/DL (ref 8–23)
BUN/CREAT SERPL: 20.5 (ref 7–25)
CALCIUM SPEC-SCNC: 9.2 MG/DL (ref 8.6–10.5)
CHLORIDE SERPL-SCNC: 105 MMOL/L (ref 98–107)
CO2 SERPL-SCNC: 25.9 MMOL/L (ref 22–29)
CREAT SERPL-MCNC: 0.83 MG/DL (ref 0.76–1.27)
ERYTHROCYTE [DISTWIDTH] IN BLOOD BY AUTOMATED COUNT: 13.2 % (ref 12.3–15.4)
GFR SERPL CREATININE-BSD FRML MDRD: 93 ML/MIN/1.73
GLOBULIN UR ELPH-MCNC: 2.9 GM/DL
GLUCOSE SERPL-MCNC: 104 MG/DL (ref 65–99)
HCT VFR BLD AUTO: 41.3 % (ref 37.5–51)
HGB BLD-MCNC: 13.6 G/DL (ref 13–17.7)
LYMPHOCYTES # BLD AUTO: 2.7 10*3/MM3 (ref 0.7–3.1)
LYMPHOCYTES NFR BLD AUTO: 45.3 % (ref 19.6–45.3)
MCH RBC QN AUTO: 30.5 PG (ref 26.6–33)
MCHC RBC AUTO-ENTMCNC: 32.9 G/DL (ref 31.5–35.7)
MCV RBC AUTO: 92.6 FL (ref 79–97)
MONOCYTES # BLD AUTO: 0.3 10*3/MM3 (ref 0.1–0.9)
MONOCYTES NFR BLD AUTO: 4.6 % (ref 5–12)
NEUTROPHILS NFR BLD AUTO: 3 10*3/MM3 (ref 1.7–7)
NEUTROPHILS NFR BLD AUTO: 50.1 % (ref 42.7–76)
PLATELET # BLD AUTO: 216 10*3/MM3 (ref 140–450)
PMV BLD AUTO: 8 FL (ref 6–12)
POTASSIUM SERPL-SCNC: 4 MMOL/L (ref 3.5–5.2)
PROT SERPL-MCNC: 7 G/DL (ref 6–8.5)
RBC # BLD AUTO: 4.46 10*6/MM3 (ref 4.14–5.8)
SODIUM SERPL-SCNC: 140 MMOL/L (ref 136–145)
TSH SERPL DL<=0.05 MIU/L-ACNC: 1.45 UIU/ML (ref 0.27–4.2)
VIT B12 BLD-MCNC: 789 PG/ML (ref 211–946)
WBC # BLD AUTO: 6 10*3/MM3 (ref 3.4–10.8)

## 2021-07-27 PROCEDURE — 84443 ASSAY THYROID STIM HORMONE: CPT | Performed by: NURSE PRACTITIONER

## 2021-07-27 PROCEDURE — 85025 COMPLETE CBC W/AUTO DIFF WBC: CPT | Performed by: NURSE PRACTITIONER

## 2021-07-27 PROCEDURE — 80053 COMPREHEN METABOLIC PANEL: CPT | Performed by: NURSE PRACTITIONER

## 2021-07-27 PROCEDURE — 82306 VITAMIN D 25 HYDROXY: CPT | Performed by: NURSE PRACTITIONER

## 2021-07-27 PROCEDURE — 99214 OFFICE O/P EST MOD 30 MIN: CPT | Performed by: NURSE PRACTITIONER

## 2021-07-27 PROCEDURE — 82607 VITAMIN B-12: CPT | Performed by: NURSE PRACTITIONER

## 2021-07-27 PROCEDURE — 36415 COLL VENOUS BLD VENIPUNCTURE: CPT | Performed by: NURSE PRACTITIONER

## 2021-07-27 NOTE — PROGRESS NOTES
"Chief Complaint  follow up abnormal C/T and Fatigue    Subjective          Maximus Bhardwaj Sr. presents to [facility]  History of Present Illness  Here today for f/u, he was seen in office 7/9/2021 for new HA, sent to [facility] ER for eval d/t numbness, please refer to H&P and d/c melanie for details. CT head showed small meningioma along the superior aspect of the lobe resulting in minimal mass effect. Told to repeat in 3-6 months to monitor. CT neck showed stenosis and degenerative changes.   C/o fatigue, taking MV, vit d and b12. States he has fatigue throughout the day. He does walk but little energy with walking for exercise. He does snore, states hx of MARRY, last sleep study 5 years ago, he did lose weight, he does nap at times. He did not use cpap.       Objective   Vital Signs:   /70 (BP Location: Left arm, Patient Position: Sitting)   Pulse 72   Temp 97.7 °F (36.5 °C) (Infrared)   Resp 18   Ht 172.7 cm (68\")   Wt 77.6 kg (171 lb)   SpO2 98%   BMI 26.00 kg/m²     Physical Exam  Vitals and nursing note reviewed.   Constitutional:       Appearance: He is well-developed.   HENT:      Head: Normocephalic.   Eyes:      Pupils: Pupils are equal, round, and reactive to light.   Cardiovascular:      Rate and Rhythm: Normal rate and regular rhythm.      Heart sounds: Normal heart sounds.   Pulmonary:      Effort: Pulmonary effort is normal.      Breath sounds: Normal breath sounds.   Skin:     General: Skin is warm and dry.   Neurological:      Mental Status: He is alert and oriented to person, place, and time.      Cranial Nerves: Cranial nerves are intact.      Sensory: Sensation is intact.      Motor: Motor function is intact.      Coordination: Coordination is intact.      Gait: Gait is intact.   Psychiatric:         Behavior: Behavior normal.         Judgment: Judgment normal.        Result Review :                 Assessment and Plan    Diagnoses and all orders for " this visit:    1. Meningioma (CMS/HCC) (Primary)  -     Ambulatory Referral to Neurosurgery  -     CBC & Differential  -     Comprehensive Metabolic Panel  -     TSH  -     Vitamin D 25 Hydroxy  -     Vitamin B12    2. Abnormal CT of the head  -     Ambulatory Referral to Neurosurgery  -     CBC & Differential  -     Comprehensive Metabolic Panel  -     TSH  -     Vitamin D 25 Hydroxy  -     Vitamin B12    3. Numbness  -     CBC & Differential  -     Comprehensive Metabolic Panel  -     TSH  -     Vitamin D 25 Hydroxy  -     Vitamin B12    4. Fatigue, unspecified type  -     Ambulatory Referral to Sleep Medicine  -     CBC & Differential  -     Comprehensive Metabolic Panel  -     TSH  -     Vitamin D 25 Hydroxy  -     Vitamin B12    5. Cervical stenosis of spinal canal  -     Ambulatory Referral to Neurosurgery  -     CBC & Differential  -     Comprehensive Metabolic Panel  -     TSH  -     Vitamin D 25 Hydroxy  -     Vitamin B12    6. DDD (degenerative disc disease), cervical  -     Ambulatory Referral to Neurosurgery  -     CBC & Differential  -     Comprehensive Metabolic Panel  -     TSH  -     Vitamin D 25 Hydroxy  -     Vitamin B12    7. Daytime sleepiness  -     Ambulatory Referral to Sleep Medicine  -     CBC & Differential  -     Comprehensive Metabolic Panel  -     TSH  -     Vitamin D 25 Hydroxy  -     Vitamin B12    8. Vitamin D deficiency, unspecified   -     Vitamin D 25 Hydroxy        Follow Up   Return if symptoms worsen or fail to improve.  Patient was given instructions and counseling regarding his condition or for health maintenance advice. Please see specific information pulled into the AVS if appropriate.     Reviewed recent imaging and ER records.   Refer to neurosurgery will call with appt.   Labs today will call with results.   Refer to sleep med will call with appt.   Patient agrees with plan of care and understands instructions. Call if worsening symptoms or any problems or concerns.

## 2021-07-27 NOTE — PATIENT INSTRUCTIONS
Reviewed recent imaging,   Refer to neurosurgery will call with appt.   Labs today will call with results.   Refer to sleep med will call with appt.   Patient agrees with plan of care and understands instructions. Call if worsening symptoms or any problems or concerns.

## 2021-08-04 NOTE — PROGRESS NOTES
Subjective   History of Present Illness: Maximus Bhardwaj Sr. is a 66 y.o. male is being seen for consultation today at the request of TERI Mclain for abnormal CT of the head. He reports mild headaches. He denies dizziness and vision problems. He also head a CT cervical performed. He reports mild neck pain with weakness in his bilateral forearms with certain activities.  He denies any imbalance or incoordination.  He denies any conservative treatment.     He says this all began as he was being worked up for possible bowel obstruction and after about 2 months of taking laxatives and drinking Cameron Beam he says he worked out the constipation and bowel issues and has been regular ever since.  He has felt fatigued though throughout all this and had some neck pain with a numb sensation in his neck with certain neck positions.  He ultimately got a CT of the head and cervical spine for the neck symptoms.  He is here because of those results.    While in the room and during my examination of the patient I wore a mask and eye protection.  I washed my hands before and after this patient encounter.  The patient was also wearing a mask.    Neck Pain   The problem occurs intermittently. The problem has been gradually worsening. The pain is present in the midline and right side. The symptoms are aggravated by position. Associated symptoms include weakness. Pertinent negatives include no chest pain, numbness or tingling. He has tried nothing for the symptoms.       The following portions of the patient's history were reviewed and updated as appropriate: allergies, current medications, past family history, past medical history, past social history, past surgical history and problem list.    Review of Systems   Constitutional: Negative for activity change.   HENT: Negative for congestion.    Eyes: Negative for visual disturbance.   Respiratory: Negative for chest tightness and shortness of breath.    Cardiovascular:  "Negative for chest pain.   Gastrointestinal: Negative for nausea.   Endocrine: Negative for cold intolerance and heat intolerance.   Genitourinary: Negative for difficulty urinating.   Musculoskeletal: Positive for neck pain and neck stiffness.   Skin: Negative for rash.   Allergic/Immunologic: Negative for environmental allergies.   Neurological: Positive for weakness. Negative for tingling and numbness.   Psychiatric/Behavioral: Negative for sleep disturbance.       Objective     Vitals:    08/10/21 1050   BP: 112/76   Temp: 98.4 °F (36.9 °C)   Weight: 77.6 kg (171 lb)   Height: 172.7 cm (68\")     Body mass index is 26 kg/m².      Physical Exam  Neurologic Exam    Physical Exam:    CONSTITUTIONAL: This 66 year old right handed  male appears well developed, well-nourished and in no acute distress.    HEAD & FACE: the head and face are symmetric, normocephalic and atraumatic.    EYES: Inspection of the conjunctivae and lids reveals no swelling, erythema or discharge.  Pupils are round, equal and reactive to light and there is no scleral icterus.    FUNDOSCOPIC:  There are no retinal hemorrhages bilaterally.  There is no papilledema bilaterally.    EARS, NOSE, MOUTH & THROAT: On inspection, the ears, nose and oral cavity are within normal limits.    NECK: the neck is supple and symmetric. The trachea is midline with no masses.    PULMONARY: Respiratory effort is normal with no increased work of breathing or signs of respiratory distress.    CARDIOVASCULAR: Pedal pulses are +2/4 bilaterally. Examination of the extremities shows no edema or varicosities.    LYMPHATIC: There is no palpable lymphadenopathy of the neck.    MUSCULOSKELETAL: Gait and station are within normal limits. The spine has normal alignment and range of motion.    SKIN: The skin is warm, dry and intact    NEUROLOGIC:    Cranial Nerves  CN II (Optic): Visual fields are full to confrontation. Fundoscopic exam is normal with sharp discs and no " vascular changes. Venous pulsations are present bilaterally. Pupils are 4 mm and briskly reactive to light.   CN III, IV, VI (Oculomotor, Trochlear, Abducens): Extraocular movements are intact without ptosis  CN V (Trigeminal): Facial sensation is intact to pinprick in all 3 divisions bilaterally. Corneal responses are intact.  CN VII (Facial): Face is symmetric with normal eye closure and smile.  CN VII (Acoustic): Hearing is normal to rubbing fingers  CN IX, X (Glossopharyngeal, Vagus): Palate elevates symmetrically. Phonation is normal.  CN XI (Spinal Accessory): Head turning and shoulder shrug are intact  CN XII (Hypoglossal): Tongue is midline with normal movements and no atrophy.     Normal motor strength noted with out ulnar drift. Muscle bulk and tone are normal.  Sensory exam is normal to fine touch to confrontational testing bilaterally  Reflexes on the right side demonstrates 0/4 Triceps Reflex, 1/4 Biceps Reflex, 0/4 Brachioradialis Reflex, 1/4 Knee Jerk Reflex, 0/4 Ankle Jerk Reflex and no ankle clonus on the right.   Reflexes on the left side demonstrates 0/4 Triceps Reflex, 1/4 Biceps Reflex, 0/4 Brachioradialis Reflex, 1/4 Knee Jerk Reflex, 0/4 Ankle Jerk Reflex and no ankle clonus on the left.  Superficial/Primitive Reflexes: primitive reflexes were absent.  Milton's, Babinski and Clonus all negative.  No coordination deficit observed.  Cortical function is intact and without deficits. Speech is normal.    PSYCHIATRIC: oriented to person, place and time. Patient's mood and affect are normal.      Assessment/Plan   Independent Review of Radiographic Studies:      I personally reviewed the images from the following studies.    CT scan of the head done on July 9, 2021 at [facility] reveals no CT evidence of acute intracranial pathology.  There is suspicion for very small meningioma along the superior aspect of the right frontal lobe measuring 6 x 7 x 5 mm with no underlying edema or  mass-effect.    CT scan of the cervical spine done on July 9, 2021 at [facility] revealed degenerative change throughout the cervical spine.  The area of most significant degenerative changes at C4-C5 slightly to the left with mild to moderate canal stenosis at that level.  Milder areas of stenosis are suspected at C3-C4, C5-C6, and C6-C7.    Medical Decision Making:      His neck symptoms do not reflect a specific myelopathy or radiculopathy and therefore he should be a good candidate for physical therapy for those symptoms.  The meningioma is incidental in nature it is only about 6 mm round and is nonthreatening to the underlying cerebral cortex at this time.  It is prudent to get a baseline MRI so we can follow the lesion over time.  We generally watch incidental meningiomas for a 5-year period of time to see if they have growth potential or threat to the underlying structures.  Since he is getting an MRI of the cervical spine and he has neck symptoms it is prudent to get the MRI of the cervical in case he does not respond to therapy as we expect.  I will see him back with the results of the therapy and the imaging studies.    Return in about 4 weeks (around 9/7/2021) for discussion of Physical Therapy results.    Diagnoses and all orders for this visit:    1. Meningioma (CMS/HCC) (Primary)  -     MRI Brain With & Without Contrast; Future    2. Neck pain  -     MRI Cervical Spine Without Contrast; Future  -     Ambulatory Referral to Physical Therapy Evaluate and treat    3. DDD (degenerative disc disease), cervical  -     MRI Cervical Spine Without Contrast; Future  -     Ambulatory Referral to Physical Therapy Evaluate and treat             Fer Herbert MD FACS FAANS  Neurological Surgery

## 2021-08-10 ENCOUNTER — OFFICE VISIT (OUTPATIENT)
Dept: NEUROSURGERY | Facility: CLINIC | Age: 66
End: 2021-08-10

## 2021-08-10 VITALS
BODY MASS INDEX: 25.91 KG/M2 | TEMPERATURE: 98.4 F | SYSTOLIC BLOOD PRESSURE: 112 MMHG | WEIGHT: 171 LBS | DIASTOLIC BLOOD PRESSURE: 76 MMHG | HEIGHT: 68 IN

## 2021-08-10 DIAGNOSIS — M50.30 DDD (DEGENERATIVE DISC DISEASE), CERVICAL: ICD-10-CM

## 2021-08-10 DIAGNOSIS — D32.9 MENINGIOMA (HCC): Primary | ICD-10-CM

## 2021-08-10 DIAGNOSIS — M54.2 NECK PAIN: ICD-10-CM

## 2021-08-10 PROCEDURE — 99204 OFFICE O/P NEW MOD 45 MIN: CPT | Performed by: NEUROLOGICAL SURGERY

## 2021-08-23 ENCOUNTER — OFFICE VISIT (OUTPATIENT)
Dept: SLEEP MEDICINE | Facility: HOSPITAL | Age: 66
End: 2021-08-23

## 2021-08-23 VITALS
HEIGHT: 68 IN | SYSTOLIC BLOOD PRESSURE: 115 MMHG | DIASTOLIC BLOOD PRESSURE: 69 MMHG | BODY MASS INDEX: 26.37 KG/M2 | WEIGHT: 174 LBS | HEART RATE: 65 BPM | OXYGEN SATURATION: 99 %

## 2021-08-23 DIAGNOSIS — G47.33 OBSTRUCTIVE SLEEP APNEA (ADULT) (PEDIATRIC): ICD-10-CM

## 2021-08-23 DIAGNOSIS — R40.0 DAYTIME SLEEPINESS: ICD-10-CM

## 2021-08-23 DIAGNOSIS — R06.83 SNORING: Primary | ICD-10-CM

## 2021-08-23 DIAGNOSIS — R53.83 FATIGUE, UNSPECIFIED TYPE: ICD-10-CM

## 2021-08-23 PROCEDURE — G0463 HOSPITAL OUTPT CLINIC VISIT: HCPCS

## 2021-08-23 NOTE — PROGRESS NOTES
"[facility]  Telephone: [phone] / Fax: [phone] [place]  Telephone: [phone] / Fax: [phone] Britany Paz    Referring Physician: Gladys Laughlin APRN  PCP: Gladys Laughlin APRN    Reason for consult:  sleep apnea    Maximus Bhardwaj Sr. is a 66 y.o.male  was seen in the Sleep Disorders Center today for evaluation of sleep apnea. He was diagnosed with MARRY in the 1990's. Tried CPAP but could not adjust.  He stopped using the machine due to intolerance. He then lost 50 lbs. Felt much better. However, April 2021 developed GI issues. Since then he has been feeling more tired/sleeping. He goes to bed at 10pm-and is up at 7am. He wakes up feeling tired in the morning.  He reports snoring-loud. Snoring occurs in all sleep positions.No gasping for breath/choking. Snoring is associated with apneas. He reports a hard time falling asleep.    SH- retired, former smoker age [age], 3 coffee/soda per day, no drugs    ROS-+frequent urination, +anxiety, rest is negative.    Maximus Bhardwaj Sr.  has a past medical history of Bladder tumor and Sleep apnea.    Current Medications:    Current Outpatient Medications:   •  ALPRAZolam (XANAX) 0.5 MG tablet, TAKE ONE TABLET BY MOUTH THREE TIMES A DAY AS NEEDED FOR ANXIETY, Disp: 90 tablet, Rfl: 0  •  amLODIPine (NORVASC) 5 MG tablet, , Disp: , Rfl:   •  atorvastatin (LIPITOR) 10 MG tablet, , Disp: , Rfl:   •  FLUoxetine (PROzac) 40 MG capsule, Take 1 capsule by mouth Daily., Disp: 90 capsule, Rfl: 3  •  lisinopril (PRINIVIL,ZESTRIL) 10 MG tablet, Take 1 tablet by mouth Daily. FOR BLOOD PRESSURE, Disp: 90 tablet, Rfl: 0  •  omeprazole (priLOSEC) 20 MG capsule, , Disp: , Rfl:     I have reviewed Past Medical History, Past Surgical History, Medication List, Social History and Family History as entered in Sleep Questionnaire and EPIC.    ESS  2   Vital Signs /69   Pulse 65   Ht 172.7 cm (68\")   Wt 78.9 kg (174 lb)   SpO2 99%   BMI " 26.46 kg/m²  Body mass index is 26.46 kg/m².    General Alert and oriented. No acute distress noted   Pharynx/Throat Class IV   Mallampati airway, large tongue, no evidence of redundant lateral pharyngeal tissue. No oral lesions. No thrush. Moist mucous membranes.   Head Normocephalic. Symmetrical. Atraumatic.    Nose No septal deviation. No drainage   Chest Wall Normal shape. Symmetric expansion with respiration. No tenderness.   Neck Trachea midline, no thyromegaly or adenopathy    Lungs Clear to auscultation bilaterally. No wheezes. No rhonchi. No rales. Respirations regular, even and unlabored.   Heart Regular rhythm and normal rate. Normal S1 and S2. No murmur   Abdomen Soft, non-tender and non-distended. Normal bowel sounds. No masses.   Extremities Moves all extremities well. No edema   Psychiatric Normal mood and affect.        Impression:  1. Snoring    2. Fatigue, unspecified type    3. Daytime sleepiness    4. Obstructive sleep apnea (adult) (pediatric)           Plan:  I discussed the pathophysiology of obstructive sleep apnea with the patient.  We discussed the adverse outcomes associated with untreated sleep-disordered breathing.  We discussed treatment modalities of obstructive sleep apnea including CPAP device. Sleep study will be scheduled to establish a definitive diagnosis of sleep disorder breathing.  Weight loss will be strongly beneficial in order to reduce the severity of sleep-disordered breathing.  Patient has narrow oropharyngeal structure.  Caution during activities that require prolonged concentration is strongly advised.  After sleep study results are available, patient will be notified, and appointment will be scheduled to discuss sleep study results and treatment recommendations.    HST was scheduled to evaluate MARRY severity after weight loss. If MARRY is mild-he can consider oral appliance. If MARRY is moderately severe-I recommended he retries CPAP with smallest possible mask, and if it  does not work, consider inspire.    He will f/u here after the study.    I appreciate the opportunity to participate in this patient's care.      TERI Mitchell  [facility]  Phone: [phone]      Part of this note may be an electronic transcription/translation of spoken language to printed text using the Dragon Dictation System. Some errors may exist even though the document was edited.

## 2021-09-01 ENCOUNTER — OFFICE (AMBULATORY)
Dept: URBAN - METROPOLITAN AREA CLINIC 75 | Facility: CLINIC | Age: 66
End: 2021-09-01

## 2021-09-01 VITALS
HEIGHT: 68 IN | WEIGHT: 171 LBS | SYSTOLIC BLOOD PRESSURE: 110 MMHG | DIASTOLIC BLOOD PRESSURE: 80 MMHG | OXYGEN SATURATION: 98 % | HEART RATE: 84 BPM | TEMPERATURE: 97.7 F

## 2021-09-01 DIAGNOSIS — R11.0 NAUSEA: ICD-10-CM

## 2021-09-01 DIAGNOSIS — K57.30 DIVERTICULOSIS OF LARGE INTESTINE WITHOUT PERFORATION OR ABS: ICD-10-CM

## 2021-09-01 DIAGNOSIS — R53.83 OTHER FATIGUE: ICD-10-CM

## 2021-09-01 PROCEDURE — 99214 OFFICE O/P EST MOD 30 MIN: CPT | Performed by: INTERNAL MEDICINE

## 2021-09-01 RX ORDER — PROMETHAZINE HYDROCHLORIDE 25 MG/1
TABLET ORAL
Qty: 60 | Refills: 1 | Status: COMPLETED
Start: 2021-09-01 | End: 2021-11-15

## 2021-09-07 ENCOUNTER — HOSPITAL ENCOUNTER (OUTPATIENT)
Dept: SLEEP MEDICINE | Facility: HOSPITAL | Age: 66
Discharge: HOME | End: 2021-09-07
Admitting: NURSE PRACTITIONER

## 2021-09-07 DIAGNOSIS — R53.83 FATIGUE, UNSPECIFIED TYPE: ICD-10-CM

## 2021-09-07 DIAGNOSIS — R40.0 DAYTIME SLEEPINESS: ICD-10-CM

## 2021-09-07 DIAGNOSIS — R06.83 SNORING: ICD-10-CM

## 2021-09-07 DIAGNOSIS — G47.33 OBSTRUCTIVE SLEEP APNEA (ADULT) (PEDIATRIC): ICD-10-CM

## 2021-09-07 PROCEDURE — 95806 SLEEP STUDY UNATT&RESP EFFT: CPT

## 2021-09-08 ENCOUNTER — HOSPITAL ENCOUNTER (OUTPATIENT)
Dept: MRI IMAGING | Facility: HOSPITAL | Age: 66
Discharge: HOME | End: 2021-09-08

## 2021-09-08 DIAGNOSIS — D32.9 MENINGIOMA (HCC): ICD-10-CM

## 2021-09-08 DIAGNOSIS — M54.2 NECK PAIN: ICD-10-CM

## 2021-09-08 DIAGNOSIS — M50.30 DDD (DEGENERATIVE DISC DISEASE), CERVICAL: ICD-10-CM

## 2021-09-08 LAB — CREAT BLDA-MCNC: 0.9 MG/DL (ref 0.6–1.3)

## 2021-09-08 PROCEDURE — 0 GADOBENATE DIMEGLUMINE 529 MG/ML SOLUTION: Performed by: NEUROLOGICAL SURGERY

## 2021-09-08 PROCEDURE — 70553 MRI BRAIN STEM W/O & W/DYE: CPT

## 2021-09-08 PROCEDURE — 82565 ASSAY OF CREATININE: CPT

## 2021-09-08 PROCEDURE — A9577 INJ MULTIHANCE: HCPCS | Performed by: NEUROLOGICAL SURGERY

## 2021-09-08 PROCEDURE — 72141 MRI NECK SPINE W/O DYE: CPT

## 2021-09-08 RX ADMIN — GADOBENATE DIMEGLUMINE 16 ML: 529 INJECTION, SOLUTION INTRAVENOUS at 08:26

## 2021-09-08 NOTE — PROGRESS NOTES
"Subjective   History of Present Illness: Maximus Bhardwaj Sr. is a 66 y.o. male is here today for follow-up with a new brain and cervical MRI and going to physical therapy that was ordered for mild headaches and mild neck pain with weakness in his bilateral forearms with certain activities.     Today, Mr. Bhardwaj reports no neck pain or headaches. Patient reports that range of motion is getting better but still restricted to the extremes of lateral rotation. Patient reports that weakness in bilateral forearms has subsided. Patient still going to PT twice a week.  Overall he is very satisfied with the results from the conservative management on his neck.    While in the room and during my examination of the patient I wore a mask and eye protection.  I washed my hands before and after this patient encounter.  The patient was also wearing a mask.    Neck Pain   The problem has been gradually improving. The patient is experiencing no pain. Pertinent negatives include no chest pain, numbness or weakness.     The following portions of the patient's history were reviewed and updated as appropriate: allergies, current medications, past family history, past medical history, past social history, past surgical history and problem list.    Review of Systems   Respiratory: Negative for chest tightness and shortness of breath.    Cardiovascular: Negative for chest pain.   Musculoskeletal: Positive for neck pain.   Neurological: Negative for weakness and numbness.       Objective     Vitals:    09/14/21 1016   BP: 118/80   Temp: 98 °F (36.7 °C)   Weight: 78.9 kg (174 lb)   Height: 172.7 cm (68\")     Body mass index is 26.46 kg/m².      Physical Exam  Neurologic Exam    Physical Exam:    CONSTITUTIONAL:  appears well developed, well-nourished and in no acute distress.    NECK: the neck is supple and symmetric. The trachea is midline with no masses.  Good range of motion today.    PULMONARY: Respiratory effort is normal with no " increased work of breathing or signs of respiratory distress.    CARDIOVASCULAR: Pedal pulses are +2/4 bilaterally. Examination of the extremities shows no edema or varicosities.    MUSCULOSKELETAL: Gait normal    SKIN: The skin is warm, dry and intact.      NEUROLOGIC:   Normal motor strength noted without ulnar drift. Muscle bulk and tone are normal.  Sensory exam is normal to fine touch to confrontational testing bilaterally  Reflexes on the right side demonstrates 0/4 Triceps Reflex, 1/4 Biceps Reflex, 0/4 Brachioradialis Reflex, 1/4 Knee Jerk Reflex, 0/4 Ankle Jerk Reflex and no ankle clonus on the right.   Reflexes on the left side demonstrates 0/4 Triceps Reflex, 1/4 Biceps Reflex, 0/4 Brachioradialis Reflex, 1/4 Knee Jerk Reflex, 0/4 Ankle Jerk Reflex and no ankle clonus on the left.  Superficial/Primitive Reflexes: primitive reflexes were absent.  Milton's, Babinski and Clonus all negative.  No coordination deficit observed.  Cortical function is intact and without deficits. Speech is normal.    PSYCHIATRIC: oriented to person, place and time. Patient's mood and affect are normal.      Assessment/Plan   Independent Review of Radiographic Studies:      I personally reviewed the images from the following studies.    MRI of the cervical spine done on September 8, 2021 at [facility] confirms multilevel degenerative change throughout the cervical spine with no evidence of focal herniation, cord signal abnormality or cord compression. There is disc osteophyte complex to the left which narrows the left side of the spinal canal contacting the anterior cord and contributing to foraminal stenosis in that location. Milder disc osteophyte complexes are seen in the subaxial cervical spine with the largest degree of neuroforaminal stenosis noted to the left at C4-C5, left C5-C6, and the right at C3-C4, C4-C5, and C5-C6.    MRI of the brain with and without contrast done on September 8, 2021 at [facility] reveals  a small meningioma overlying the right frontal lobe superior laterally measuring 5 x 4 x 8 mm in size. There is no underlying edema or mass-effect.    CT scan of the head done on July 9, 2021 at [facility] reveals no CT evidence of acute intracranial pathology.  There is suspicion for very small meningioma along the superior aspect of the right frontal lobe measuring 6 x 7 x 5 mm with no underlying edema or mass-effect.     CT scan of the cervical spine done on July 9, 2021 at [facility] revealed degenerative change throughout the cervical spine.  The area of most significant degenerative changes at C4-C5 slightly to the left with mild to moderate canal stenosis at that level.  Milder areas of stenosis are suspected at C3-C4, C5-C6, and C6-C7.       Medical Decision Making:      Fortunately, clinically he is improved with physical therapy and does not have any severe cervical stenosis to require any upfront surgical treatment.  He does have neuroforaminal stenosis which may have been the culprit for some of his symptomatology but he has responded well to conservative management.  Therefore of encouraged him to continue the physical therapy exercises in an effort to try to avoid more advanced neck treatments.    The MRI brain confirms the small right frontal meningioma measuring an overall size of 5 x 4 x 8 mm.  This study will serve as an excellent baseline to follow this lesion over the next few years.  Plan on seeing him back in 1 year with an MRI of the brain with and without contrast.    Return in about 1 year (around 9/14/2022) for review of annual MRI.    Diagnoses and all orders for this visit:    1. Meningioma (CMS/HCC) (Primary)  -     MRI Brain With & Without Contrast; Future    2. DDD (degenerative disc disease), cervical    3. Neck pain             Fer Herbert MD FACS FAANS  Neurological Surgery

## 2021-09-14 ENCOUNTER — OFFICE VISIT (OUTPATIENT)
Dept: NEUROSURGERY | Facility: CLINIC | Age: 66
End: 2021-09-14

## 2021-09-14 VITALS
TEMPERATURE: 98 F | HEIGHT: 68 IN | SYSTOLIC BLOOD PRESSURE: 118 MMHG | WEIGHT: 174 LBS | BODY MASS INDEX: 26.37 KG/M2 | DIASTOLIC BLOOD PRESSURE: 80 MMHG

## 2021-09-14 DIAGNOSIS — M54.2 NECK PAIN: ICD-10-CM

## 2021-09-14 DIAGNOSIS — M50.30 DDD (DEGENERATIVE DISC DISEASE), CERVICAL: ICD-10-CM

## 2021-09-14 DIAGNOSIS — D32.9 MENINGIOMA (HCC): Primary | ICD-10-CM

## 2021-09-14 PROCEDURE — 99214 OFFICE O/P EST MOD 30 MIN: CPT | Performed by: NEUROLOGICAL SURGERY

## 2021-09-14 RX ORDER — PROMETHAZINE HYDROCHLORIDE 25 MG/1
TABLET ORAL
COMMUNITY
Start: 2021-09-01 | End: 2021-09-14

## 2021-09-15 DIAGNOSIS — F41.9 ANXIETY: ICD-10-CM

## 2021-09-15 RX ORDER — ALPRAZOLAM 0.5 MG/1
TABLET ORAL
Qty: 90 TABLET | OUTPATIENT
Start: 2021-09-15

## 2021-09-15 RX ORDER — ALPRAZOLAM 0.5 MG/1
0.5 TABLET ORAL 2 TIMES DAILY PRN
Qty: 60 TABLET | Refills: 0 | Status: SHIPPED | OUTPATIENT
Start: 2021-09-15 | End: 2022-03-15 | Stop reason: ALTCHOICE

## 2021-09-15 NOTE — TELEPHONE ENCOUNTER
Pt states he was down to 1 a day but found out he had a brain tumor so he has been taking about 1-2 a day

## 2021-09-20 VITALS
OXYGEN SATURATION: 97 % | RESPIRATION RATE: 23 BRPM | SYSTOLIC BLOOD PRESSURE: 85 MMHG | DIASTOLIC BLOOD PRESSURE: 36 MMHG | TEMPERATURE: 97.5 F | SYSTOLIC BLOOD PRESSURE: 131 MMHG | HEART RATE: 83 BPM | DIASTOLIC BLOOD PRESSURE: 98 MMHG | HEART RATE: 87 BPM | RESPIRATION RATE: 20 BRPM | TEMPERATURE: 98.1 F | WEIGHT: 171 LBS | DIASTOLIC BLOOD PRESSURE: 47 MMHG | DIASTOLIC BLOOD PRESSURE: 38 MMHG | HEART RATE: 82 BPM | OXYGEN SATURATION: 100 % | SYSTOLIC BLOOD PRESSURE: 80 MMHG | HEART RATE: 89 BPM | RESPIRATION RATE: 16 BRPM | SYSTOLIC BLOOD PRESSURE: 99 MMHG | RESPIRATION RATE: 11 BRPM | HEART RATE: 88 BPM | DIASTOLIC BLOOD PRESSURE: 53 MMHG | SYSTOLIC BLOOD PRESSURE: 118 MMHG | RESPIRATION RATE: 17 BRPM | OXYGEN SATURATION: 98 % | RESPIRATION RATE: 22 BRPM | DIASTOLIC BLOOD PRESSURE: 64 MMHG | SYSTOLIC BLOOD PRESSURE: 84 MMHG | HEART RATE: 90 BPM | SYSTOLIC BLOOD PRESSURE: 102 MMHG | OXYGEN SATURATION: 96 % | DIASTOLIC BLOOD PRESSURE: 48 MMHG | HEIGHT: 68 IN | OXYGEN SATURATION: 94 %

## 2021-09-21 ENCOUNTER — OFFICE (AMBULATORY)
Dept: URBAN - METROPOLITAN AREA PATHOLOGY 4 | Facility: PATHOLOGY | Age: 66
End: 2021-09-21

## 2021-09-21 ENCOUNTER — AMBULATORY SURGICAL CENTER (AMBULATORY)
Dept: URBAN - METROPOLITAN AREA SURGERY 17 | Facility: SURGERY | Age: 66
End: 2021-09-21

## 2021-09-21 DIAGNOSIS — K29.50 UNSPECIFIED CHRONIC GASTRITIS WITHOUT BLEEDING: ICD-10-CM

## 2021-09-21 DIAGNOSIS — B96.81 HELICOBACTER PYLORI [H. PYLORI] AS THE CAUSE OF DISEASES CLA: ICD-10-CM

## 2021-09-21 DIAGNOSIS — R11.0 NAUSEA: ICD-10-CM

## 2021-09-21 LAB
GI HISTOLOGY: A. UNSPECIFIED: (no result)
GI HISTOLOGY: B. UNSPECIFIED: (no result)
GI HISTOLOGY: C. UNSPECIFIED: (no result)
GI HISTOLOGY: PDF REPORT: (no result)

## 2021-09-21 PROCEDURE — 88305 TISSUE EXAM BY PATHOLOGIST: CPT | Performed by: INTERNAL MEDICINE

## 2021-09-21 PROCEDURE — 43239 EGD BIOPSY SINGLE/MULTIPLE: CPT | Performed by: INTERNAL MEDICINE

## 2021-09-21 RX ORDER — OMEPRAZOLE 40 MG/1
40 CAPSULE, DELAYED RELEASE ORAL
Qty: 90 | Refills: 4 | Status: COMPLETED
Start: 2021-09-21 | End: 2022-01-26

## 2021-09-21 NOTE — SERVICEHPINOTES
Mr. Vera is a 66M following up regarding nausea. He was last seen for a screening colonoscopy in June of this past year. He had no colon polyps, he was found to have some diverticulosis. Prior to the colonoscopy he was having some constipation, since in his bowels are moving regularly. He has no abdominal pain. His main issue is been persistent nausea. It happens intermittently, there no particular exacerbating or relieving factors. He also has no energy. He has had extensive workup for this. He has had several ER visits, most recently he was found have a meningioma. He has followed up with what sounds a neurosurgeon who was told him that it is unlikely to be causing his nausea.No prior EGD. He has tried taking omeprazole. It did not help. He takes Tums as needed which does help some. Zofran did not help either.

## 2021-10-01 ENCOUNTER — OFFICE VISIT (OUTPATIENT)
Dept: SLEEP MEDICINE | Facility: HOSPITAL | Age: 66
End: 2021-10-01

## 2021-10-01 VITALS
HEART RATE: 72 BPM | OXYGEN SATURATION: 99 % | BODY MASS INDEX: 26.37 KG/M2 | HEIGHT: 68 IN | SYSTOLIC BLOOD PRESSURE: 108 MMHG | DIASTOLIC BLOOD PRESSURE: 73 MMHG | WEIGHT: 174 LBS

## 2021-10-01 DIAGNOSIS — Z72.0 TOBACCO ABUSE: ICD-10-CM

## 2021-10-01 DIAGNOSIS — G47.33 OBSTRUCTIVE SLEEP APNEA: Primary | ICD-10-CM

## 2021-10-01 PROCEDURE — G0463 HOSPITAL OUTPT CLINIC VISIT: HCPCS

## 2021-10-01 NOTE — PROGRESS NOTES
"[facility] SLEEP MEDICINE  [address] [facility]  3RD FLOOR  [facility] [address]  [phone]    PCP: Gladys Laughlin APRN    Reason for visit:  Sleep disorders: MARRY    Maximus is a 66 y.o.male who was seen in the [facility] today. Here to discuss HST. He previously had severe sleep apnea. However he lost 50 lbs and current HST shows moderate sleep apnea. He wakes up tired and unrefreshed. He sleeps from 10:30pm to 7am. His daytime EDS started only 6 months ago - he was recently dx with bacterial overgrowth of GI tract.  He still smokes e-cigs. Previous 92 pack years.  [place] Sleepiness Scale is 2. Caffeine 3 per day. Alcohol 0 per week.    Maximus  reports that he quit smoking about 2 years ago. His smoking use included cigarettes. He has a 92.00 pack-year smoking history. He has never used smokeless tobacco.    Pertinent Positive Review of Systems of denies  Rest of Review of Systems was negative as recorded in Sleep Questionnaire.    Patient  has a past medical history of Bladder tumor and Sleep apnea.     Current Medications:    Current Outpatient Medications:   •  ALPRAZolam (XANAX) 0.5 MG tablet, Take 1 tablet by mouth 2 (Two) Times a Day As Needed for Anxiety. for anxiety, Disp: 60 tablet, Rfl: 0  •  amLODIPine (NORVASC) 5 MG tablet, , Disp: , Rfl:   •  atorvastatin (LIPITOR) 10 MG tablet, , Disp: , Rfl:   •  FLUoxetine (PROzac) 40 MG capsule, Take 1 capsule by mouth Daily., Disp: 90 capsule, Rfl: 3  •  lisinopril (PRINIVIL,ZESTRIL) 10 MG tablet, Take 1 tablet by mouth Daily. FOR BLOOD PRESSURE, Disp: 90 tablet, Rfl: 0   also entered in Sleep Questionnaire         Vital Signs: /73   Pulse 72   Ht 172.7 cm (67.99\")   Wt 78.9 kg (174 lb)   SpO2 99%   BMI 26.46 kg/m²     Body mass index is 26.46 kg/m².       Tongue: Large       Dentition: good       Pharynx: Posterior pharyngeal pillars are unable to see   Mallampatti: IV (only hard palate visible)        General: Alert. " Cooperative. Well developed. No acute distress.             Head:  Normocephalic. Symmetrical. Atraumatic.              Nose: No septal deviation. No drainage.          Throat: No oral lesions. No thrush. Moist mucous membranes.    Chest Wall:  Normal shape. Symmetric expansion with respiration. No tenderness.             Neck:  Trachea midline.           Lungs:  Clear to auscultation bilaterally. No wheezes. No rhonchi. No rales. Respirations regular, even and unlabored. Decreased breath sounds            Heart:  Regular rhythm and normal rate. Normal S1 and S2. No murmur.     Abdomen:  Soft, non-tender and non-distended. Normal bowel sounds. No masses.  Extremities:  Moves all extremities well. No edema.    Psychiatric: Normal mood and affect.    Diagnostic data available to date is as below and was reviewed on current visit:  · 9/8/21  : The patient tolerated the home sleep testing with monitoring time of 461 minutes. The data obtained make this a technically adequate study. The apnea hypopneas index(AHI) was 16.9 per sleep hour.  The AHI during supine position was 1.9 per sleep hour. Mean heart rate of 63.9 BPM.  Snoring was noted 47.1% of sleep time. Lowest oxygen saturation during the study was 77%. Saturation below 89% was noted for 20.6 mins.       Impression:  1. Obstructive sleep apnea    2. Tobacco abuse        Plan:  Maximus has moderate MARRY. Discussed study at length. Options of CPAP and INSPIRE discussed. He agrees to proceed with CPAP. Advised of ins requirements.    Hx cig smoker now e-cigs. He states pft's at [facility] last year were normal. Also gets LDSCT annually through PCP.    I reiterated the importance of effective treatment of obstructive sleep apnea with PAP machine.  Cardiovascular health risks of untreated sleep apnea were again reviewed.  Patient was asked to remain cautious if there is persistent hypersomnolence. The benefit of weight loss in reducing severity of obstructive sleep apnea was  discussed.  Patient would benefit from adhering to a strict diet to achieve ideal BMI.     Change of PAP supplies regularly is important for effective use.  Change of cushion on the mask or plugs on nasal pillows along with disposable filters once every month and change of mask frame, tubing, headgear and Velcro straps every 6 months at the minimum was reiterated.    This patient is compliant with PAP machine and benefits from its use.  Apnea hypopneas index is corrected/improved.  Daytime hypersomnolence has resolved.     Patient will follow up in this clinic in 1 month  APRN    Thank you for allowing me to participate in your patient's care.    Electronically signed by John Lopez MD, 10/01/21, 9:07 AM EDT.    Part of this note may be an electronic transcription/translation of spoken language to printed text using the Dragon Dictation System.

## 2021-10-06 ENCOUNTER — TELEPHONE (OUTPATIENT)
Dept: SLEEP MEDICINE | Facility: HOSPITAL | Age: 66
End: 2021-10-06

## 2021-10-25 ENCOUNTER — APPOINTMENT (OUTPATIENT)
Dept: SLEEP MEDICINE | Facility: HOSPITAL | Age: 66
End: 2021-10-25

## 2021-11-15 ENCOUNTER — OFFICE (AMBULATORY)
Dept: URBAN - METROPOLITAN AREA CLINIC 75 | Facility: CLINIC | Age: 66
End: 2021-11-15

## 2021-11-15 VITALS
WEIGHT: 154 LBS | OXYGEN SATURATION: 99 % | TEMPERATURE: 98 F | HEART RATE: 79 BPM | SYSTOLIC BLOOD PRESSURE: 120 MMHG | DIASTOLIC BLOOD PRESSURE: 80 MMHG | HEIGHT: 68 IN

## 2021-11-15 DIAGNOSIS — R11.0 NAUSEA: ICD-10-CM

## 2021-11-15 DIAGNOSIS — A04.8 OTHER SPECIFIED BACTERIAL INTESTINAL INFECTIONS: ICD-10-CM

## 2021-11-15 PROCEDURE — 99214 OFFICE O/P EST MOD 30 MIN: CPT | Performed by: NURSE PRACTITIONER

## 2021-11-15 RX ORDER — FAMOTIDINE 40 MG/1
TABLET, FILM COATED ORAL
Qty: 28 | Refills: 0 | Status: COMPLETED
Start: 2021-11-15 | End: 2022-01-26

## 2021-12-09 ENCOUNTER — OFFICE VISIT (OUTPATIENT)
Dept: FAMILY MEDICINE CLINIC | Facility: CLINIC | Age: 66
End: 2021-12-09

## 2021-12-09 VITALS
HEART RATE: 88 BPM | SYSTOLIC BLOOD PRESSURE: 120 MMHG | DIASTOLIC BLOOD PRESSURE: 78 MMHG | BODY MASS INDEX: 26.8 KG/M2 | WEIGHT: 176.8 LBS | OXYGEN SATURATION: 97 % | TEMPERATURE: 99.6 F | HEIGHT: 68 IN

## 2021-12-09 DIAGNOSIS — D32.9 MENINGIOMA (HCC): ICD-10-CM

## 2021-12-09 DIAGNOSIS — E55.9 VITAMIN D DEFICIENCY, UNSPECIFIED: ICD-10-CM

## 2021-12-09 DIAGNOSIS — R11.0 NAUSEA: Primary | ICD-10-CM

## 2021-12-09 DIAGNOSIS — I10 ESSENTIAL HYPERTENSION: ICD-10-CM

## 2021-12-09 DIAGNOSIS — M50.30 DDD (DEGENERATIVE DISC DISEASE), CERVICAL: ICD-10-CM

## 2021-12-09 PROBLEM — K57.90 DIVERTICULOSIS: Status: ACTIVE | Noted: 2021-12-09

## 2021-12-09 PROBLEM — H35.30 MACULAR DEGENERATION: Status: ACTIVE | Noted: 2021-12-09

## 2021-12-09 LAB
25(OH)D3 SERPL-MCNC: 36.2 NG/ML (ref 30–100)
ALBUMIN SERPL-MCNC: 4.8 G/DL (ref 3.5–5.2)
ALBUMIN/GLOB SERPL: 1.6 G/DL
ALP SERPL-CCNC: 91 U/L (ref 39–117)
ALT SERPL W P-5'-P-CCNC: 13 U/L (ref 1–41)
AMYLASE SERPL-CCNC: 103 U/L (ref 28–100)
ANION GAP SERPL CALCULATED.3IONS-SCNC: 10.5 MMOL/L (ref 5–15)
AST SERPL-CCNC: 14 U/L (ref 1–40)
BILIRUB SERPL-MCNC: 0.4 MG/DL (ref 0–1.2)
BUN SERPL-MCNC: 16 MG/DL (ref 8–23)
BUN/CREAT SERPL: 21.1 (ref 7–25)
CALCIUM SPEC-SCNC: 9.3 MG/DL (ref 8.6–10.5)
CHLORIDE SERPL-SCNC: 101 MMOL/L (ref 98–107)
CHOLEST SERPL-MCNC: 174 MG/DL (ref 0–200)
CO2 SERPL-SCNC: 27.5 MMOL/L (ref 22–29)
CREAT SERPL-MCNC: 0.76 MG/DL (ref 0.76–1.27)
DEPRECATED RDW RBC AUTO: 42.9 FL (ref 37–54)
ERYTHROCYTE [DISTWIDTH] IN BLOOD BY AUTOMATED COUNT: 12.7 % (ref 12.3–15.4)
GFR SERPL CREATININE-BSD FRML MDRD: 103 ML/MIN/1.73
GLOBULIN UR ELPH-MCNC: 3 GM/DL
GLUCOSE SERPL-MCNC: 101 MG/DL (ref 65–99)
HCT VFR BLD AUTO: 45.6 % (ref 37.5–51)
HDLC SERPL-MCNC: 40 MG/DL (ref 40–60)
HGB BLD-MCNC: 15 G/DL (ref 13–17.7)
LDLC SERPL CALC-MCNC: 110 MG/DL (ref 0–100)
LDLC/HDLC SERPL: 2.68 {RATIO}
MCH RBC QN AUTO: 30.6 PG (ref 26.6–33)
MCHC RBC AUTO-ENTMCNC: 32.9 G/DL (ref 31.5–35.7)
MCV RBC AUTO: 93.1 FL (ref 79–97)
PLATELET # BLD AUTO: 252 10*3/MM3 (ref 140–450)
PMV BLD AUTO: 11.5 FL (ref 6–12)
POTASSIUM SERPL-SCNC: 4.4 MMOL/L (ref 3.5–5.2)
PROT SERPL-MCNC: 7.8 G/DL (ref 6–8.5)
RBC # BLD AUTO: 4.9 10*6/MM3 (ref 4.14–5.8)
SODIUM SERPL-SCNC: 139 MMOL/L (ref 136–145)
TRIGL SERPL-MCNC: 134 MG/DL (ref 0–150)
VLDLC SERPL-MCNC: 24 MG/DL (ref 5–40)
WBC NRBC COR # BLD: 6.39 10*3/MM3 (ref 3.4–10.8)

## 2021-12-09 PROCEDURE — 80053 COMPREHEN METABOLIC PANEL: CPT | Performed by: INTERNAL MEDICINE

## 2021-12-09 PROCEDURE — 80061 LIPID PANEL: CPT | Performed by: INTERNAL MEDICINE

## 2021-12-09 PROCEDURE — 82306 VITAMIN D 25 HYDROXY: CPT | Performed by: INTERNAL MEDICINE

## 2021-12-09 PROCEDURE — 36415 COLL VENOUS BLD VENIPUNCTURE: CPT | Performed by: INTERNAL MEDICINE

## 2021-12-09 PROCEDURE — 85027 COMPLETE CBC AUTOMATED: CPT | Performed by: INTERNAL MEDICINE

## 2021-12-09 PROCEDURE — 99214 OFFICE O/P EST MOD 30 MIN: CPT | Performed by: INTERNAL MEDICINE

## 2021-12-09 PROCEDURE — 82150 ASSAY OF AMYLASE: CPT | Performed by: INTERNAL MEDICINE

## 2021-12-09 RX ORDER — FLUOXETINE HYDROCHLORIDE 40 MG/1
CAPSULE ORAL
Qty: 90 CAPSULE | Refills: 3 | Status: SHIPPED | OUTPATIENT
Start: 2021-12-09 | End: 2021-12-09 | Stop reason: DRUGHIGH

## 2021-12-09 RX ORDER — ATORVASTATIN CALCIUM 10 MG/1
10 TABLET, FILM COATED ORAL NIGHTLY
Qty: 90 TABLET | Refills: 1 | Status: SHIPPED | OUTPATIENT
Start: 2021-12-09 | End: 2022-06-13

## 2021-12-09 RX ORDER — ONDANSETRON HYDROCHLORIDE 8 MG/1
8 TABLET, FILM COATED ORAL EVERY 8 HOURS PRN
Qty: 30 TABLET | Refills: 3 | Status: SHIPPED | OUTPATIENT
Start: 2021-12-09 | End: 2022-06-28

## 2021-12-09 RX ORDER — OMEPRAZOLE 40 MG/1
CAPSULE, DELAYED RELEASE ORAL
COMMUNITY
Start: 2021-09-21 | End: 2022-08-23 | Stop reason: ALTCHOICE

## 2021-12-09 RX ORDER — FLUOXETINE 10 MG/1
10 CAPSULE ORAL DAILY
Qty: 30 CAPSULE | Refills: 0 | Status: SHIPPED | OUTPATIENT
Start: 2021-12-09 | End: 2022-02-10

## 2021-12-09 RX ORDER — FLUOXETINE HYDROCHLORIDE 20 MG/1
20 CAPSULE ORAL DAILY
Qty: 30 CAPSULE | Refills: 0 | Status: SHIPPED | OUTPATIENT
Start: 2021-12-09 | End: 2022-02-10

## 2021-12-09 NOTE — PROGRESS NOTES
Subjective   Maximus Bhardwaj Sr. is a 66 y.o. male.     Vitals:    12/09/21 1007   BP: 120/78   Pulse: 88   Temp: 99.6 °F (37.6 °C)   SpO2: 97%      Body mass index is 26.88 kg/m².     History of Present Illness   Patient was seen for nausea.  Patient has been nauseated since April.  Patient had an EGD which showed H. pylori and was treated for that.  Colonoscopy shows diverticulosis.  Patient did have a CT scan of the head which showed a meningioma over the right frontal lobe.  Patient did see neurosurgery who said it was insignificant.  Patient was put on Zofran 8 mg every 8 hours as needed.  Patient also had labs today.  Patient was placed on fluoxetine 40 mg daily.  Patient states is not helped him.  Patient is being weaned off it 20 mg daily for a month and then 10 mg daily for a month.  It is possible the fluoxetine made him nauseated.  Patient does have degenerative joint disease.  Patient does use over-the-counter medication for pain relief.  Patient does have a vitamin D3 deficiency is supplementing with over-the-counter D3.  Patient will follow up in 2 months after weaning off Prozac.    Dictated utilizing Dragon dictation. If there are questions or for further clarification, please contact me.  The following portions of the patient's history were reviewed and updated as appropriate: allergies, current medications, past family history, past medical history, past social history, past surgical history and problem list.    Review of Systems   Constitutional: Negative for fatigue and fever.   HENT: Positive for congestion. Negative for trouble swallowing.    Eyes: Negative for discharge and visual disturbance.   Respiratory: Negative for choking and shortness of breath.    Cardiovascular: Negative for chest pain and palpitations.   Gastrointestinal: Positive for nausea. Negative for abdominal pain and blood in stool.   Endocrine: Negative.    Genitourinary: Negative for genital sores and hematuria.    Musculoskeletal: Negative for gait problem and joint swelling.   Skin: Negative for color change, pallor, rash and wound.   Allergic/Immunologic: Positive for environmental allergies. Negative for immunocompromised state.   Neurological: Negative for facial asymmetry and speech difficulty.   Psychiatric/Behavioral: Negative for hallucinations and suicidal ideas. The patient is nervous/anxious.        Objective   Physical Exam  Vitals and nursing note reviewed.   Constitutional:       Appearance: Normal appearance. He is well-developed.   HENT:      Head: Normocephalic and atraumatic.      Nose: Nose normal.      Mouth/Throat:      Mouth: Mucous membranes are moist.      Pharynx: Oropharynx is clear.   Eyes:      Extraocular Movements: Extraocular movements intact.      Conjunctiva/sclera: Conjunctivae normal.      Pupils: Pupils are equal, round, and reactive to light.   Cardiovascular:      Rate and Rhythm: Normal rate and regular rhythm.      Heart sounds: Normal heart sounds. No murmur heard.  No friction rub. No gallop.    Pulmonary:      Effort: Pulmonary effort is normal. No respiratory distress.      Breath sounds: Normal breath sounds. No stridor. No wheezing, rhonchi or rales.   Chest:      Chest wall: No tenderness.   Abdominal:      General: Bowel sounds are normal. There is no distension.      Palpations: Abdomen is soft. There is no mass.      Tenderness: There is no abdominal tenderness. There is no right CVA tenderness, left CVA tenderness, guarding or rebound.      Hernia: No hernia is present.   Musculoskeletal:         General: Normal range of motion.      Cervical back: Normal range of motion and neck supple.   Skin:     General: Skin is warm and dry.   Neurological:      General: No focal deficit present.      Mental Status: He is alert and oriented to person, place, and time. Mental status is at baseline.   Psychiatric:         Behavior: Behavior normal.         Thought Content: Thought  content normal.         Judgment: Judgment normal.      Comments: Patient has moderate anxiety         Assessment/Plan #1 Zofran 8 mg p.o. every 8 as needed nausea vomiting #2 yearly CT scans for the meningioma #3 wean off Prozac No. 4 follow-up in 2 months  Problems Addressed this Visit        Cardiac and Vasculature    Essential hypertension    Relevant Orders    Amylase    CBC (No Diff)    Comprehensive Metabolic Panel    Lipid Panel    Vitamin D 25 Hydroxy       Hematology and Neoplasia    Meningioma (HCC)    Relevant Orders    Amylase    CBC (No Diff)    Comprehensive Metabolic Panel    Lipid Panel    Vitamin D 25 Hydroxy       Neuro    DDD (degenerative disc disease), cervical    Relevant Orders    Amylase    CBC (No Diff)    Comprehensive Metabolic Panel    Lipid Panel    Vitamin D 25 Hydroxy      Other Visit Diagnoses     Nausea    -  Primary    Relevant Orders    Amylase    CBC (No Diff)    Comprehensive Metabolic Panel    Lipid Panel    Vitamin D 25 Hydroxy    Vitamin D deficiency, unspecified         Relevant Orders    Vitamin D 25 Hydroxy      Diagnoses     Diagnosis Codes Comments    Nausea    -  Primary ICD-10-CM: R11.0  ICD-9-CM: 787.02     Meningioma (HCC)     ICD-10-CM: D32.9  ICD-9-CM: 225.2     DDD (degenerative disc disease), cervical     ICD-10-CM: M50.30  ICD-9-CM: 722.4     Essential hypertension     ICD-10-CM: I10  ICD-9-CM: 401.9     Vitamin D deficiency, unspecified      ICD-10-CM: E55.9  ICD-9-CM: 268.9

## 2022-01-05 ENCOUNTER — LAB (OUTPATIENT)
Dept: LAB | Facility: HOSPITAL | Age: 67
End: 2022-01-05

## 2022-01-05 ENCOUNTER — TRANSCRIBE ORDERS (OUTPATIENT)
Dept: ADMINISTRATIVE | Facility: HOSPITAL | Age: 67
End: 2022-01-05

## 2022-01-05 ENCOUNTER — HOSPITAL ENCOUNTER (OUTPATIENT)
Dept: CARDIOLOGY | Facility: HOSPITAL | Age: 67
Discharge: HOME | End: 2022-01-05

## 2022-01-05 DIAGNOSIS — H35.371 RIGHT EPIRETINAL MEMBRANE: Primary | ICD-10-CM

## 2022-01-05 DIAGNOSIS — H35.371 RIGHT EPIRETINAL MEMBRANE: ICD-10-CM

## 2022-01-05 LAB
ANION GAP SERPL CALCULATED.3IONS-SCNC: 11.2 MMOL/L (ref 5–15)
BASOPHILS # BLD AUTO: 0.05 10*3/MM3 (ref 0–0.2)
BASOPHILS NFR BLD AUTO: 0.7 % (ref 0–1.5)
BUN SERPL-MCNC: 12 MG/DL (ref 8–23)
BUN/CREAT SERPL: 13.8 (ref 7–25)
CALCIUM SPEC-SCNC: 8.9 MG/DL (ref 8.6–10.5)
CHLORIDE SERPL-SCNC: 105 MMOL/L (ref 98–107)
CO2 SERPL-SCNC: 26.8 MMOL/L (ref 22–29)
CREAT SERPL-MCNC: 0.87 MG/DL (ref 0.76–1.27)
DEPRECATED RDW RBC AUTO: 41 FL (ref 37–54)
EOSINOPHIL # BLD AUTO: 0.13 10*3/MM3 (ref 0–0.4)
EOSINOPHIL NFR BLD AUTO: 1.9 % (ref 0.3–6.2)
ERYTHROCYTE [DISTWIDTH] IN BLOOD BY AUTOMATED COUNT: 12.6 % (ref 12.3–15.4)
GFR SERPL CREATININE-BSD FRML MDRD: 88 ML/MIN/1.73
GLUCOSE SERPL-MCNC: 87 MG/DL (ref 65–99)
HCT VFR BLD AUTO: 40.9 % (ref 37.5–51)
HGB BLD-MCNC: 14 G/DL (ref 13–17.7)
IMM GRANULOCYTES # BLD AUTO: 0.02 10*3/MM3 (ref 0–0.05)
IMM GRANULOCYTES NFR BLD AUTO: 0.3 % (ref 0–0.5)
LYMPHOCYTES # BLD AUTO: 2.95 10*3/MM3 (ref 0.7–3.1)
LYMPHOCYTES NFR BLD AUTO: 44.2 % (ref 19.6–45.3)
MCH RBC QN AUTO: 30.6 PG (ref 26.6–33)
MCHC RBC AUTO-ENTMCNC: 34.2 G/DL (ref 31.5–35.7)
MCV RBC AUTO: 89.3 FL (ref 79–97)
MONOCYTES # BLD AUTO: 0.44 10*3/MM3 (ref 0.1–0.9)
MONOCYTES NFR BLD AUTO: 6.6 % (ref 5–12)
NEUTROPHILS NFR BLD AUTO: 3.09 10*3/MM3 (ref 1.7–7)
NEUTROPHILS NFR BLD AUTO: 46.3 % (ref 42.7–76)
NRBC BLD AUTO-RTO: 0 /100 WBC (ref 0–0.2)
PLATELET # BLD AUTO: 259 10*3/MM3 (ref 140–450)
PMV BLD AUTO: 11.3 FL (ref 6–12)
POTASSIUM SERPL-SCNC: 4.3 MMOL/L (ref 3.5–5.2)
RBC # BLD AUTO: 4.58 10*6/MM3 (ref 4.14–5.8)
SODIUM SERPL-SCNC: 143 MMOL/L (ref 136–145)
WBC NRBC COR # BLD: 6.68 10*3/MM3 (ref 3.4–10.8)

## 2022-01-05 PROCEDURE — 80048 BASIC METABOLIC PNL TOTAL CA: CPT

## 2022-01-05 PROCEDURE — 36415 COLL VENOUS BLD VENIPUNCTURE: CPT

## 2022-01-05 PROCEDURE — 93010 ELECTROCARDIOGRAM REPORT: CPT | Performed by: INTERNAL MEDICINE

## 2022-01-05 PROCEDURE — 85025 COMPLETE CBC W/AUTO DIFF WBC: CPT

## 2022-01-05 PROCEDURE — 93005 ELECTROCARDIOGRAM TRACING: CPT | Performed by: OPHTHALMOLOGY

## 2022-01-06 LAB — QT INTERVAL: 368 MS

## 2022-01-26 ENCOUNTER — OFFICE (AMBULATORY)
Dept: URBAN - METROPOLITAN AREA CLINIC 75 | Facility: CLINIC | Age: 67
End: 2022-01-26

## 2022-01-26 VITALS
WEIGHT: 183 LBS | DIASTOLIC BLOOD PRESSURE: 82 MMHG | TEMPERATURE: 94.4 F | HEART RATE: 80 BPM | OXYGEN SATURATION: 99 % | SYSTOLIC BLOOD PRESSURE: 110 MMHG | HEIGHT: 68 IN

## 2022-01-26 DIAGNOSIS — R11.0 NAUSEA: ICD-10-CM

## 2022-01-26 DIAGNOSIS — A04.8 OTHER SPECIFIED BACTERIAL INTESTINAL INFECTIONS: ICD-10-CM

## 2022-01-26 PROCEDURE — 99214 OFFICE O/P EST MOD 30 MIN: CPT | Performed by: INTERNAL MEDICINE

## 2022-02-10 ENCOUNTER — OFFICE VISIT (OUTPATIENT)
Dept: FAMILY MEDICINE CLINIC | Facility: CLINIC | Age: 67
End: 2022-02-10

## 2022-02-10 VITALS
WEIGHT: 178.4 LBS | SYSTOLIC BLOOD PRESSURE: 110 MMHG | TEMPERATURE: 98.2 F | BODY MASS INDEX: 27.04 KG/M2 | RESPIRATION RATE: 15 BRPM | HEIGHT: 68 IN | OXYGEN SATURATION: 99 % | DIASTOLIC BLOOD PRESSURE: 70 MMHG | HEART RATE: 87 BPM

## 2022-02-10 DIAGNOSIS — M50.30 DDD (DEGENERATIVE DISC DISEASE), CERVICAL: Primary | ICD-10-CM

## 2022-02-10 DIAGNOSIS — I10 ESSENTIAL HYPERTENSION: ICD-10-CM

## 2022-02-10 DIAGNOSIS — F41.9 ANXIETY: ICD-10-CM

## 2022-02-10 DIAGNOSIS — K57.90 DIVERTICULOSIS: ICD-10-CM

## 2022-02-10 PROCEDURE — 99214 OFFICE O/P EST MOD 30 MIN: CPT | Performed by: INTERNAL MEDICINE

## 2022-02-10 RX ORDER — CHLORAL HYDRATE 500 MG
CAPSULE ORAL
COMMUNITY
End: 2022-08-23 | Stop reason: ALTCHOICE

## 2022-02-10 RX ORDER — ASPIRIN 81 MG/1
81 TABLET ORAL DAILY
COMMUNITY
End: 2022-08-23 | Stop reason: ALTCHOICE

## 2022-02-10 RX ORDER — CLONAZEPAM 0.5 MG/1
0.5 TABLET ORAL 2 TIMES DAILY PRN
Qty: 60 TABLET | Refills: 1 | Status: SHIPPED | OUTPATIENT
Start: 2022-02-10 | End: 2022-04-12

## 2022-02-10 NOTE — PROGRESS NOTES
Subjective   Maximus Bhardwaj Sr. is a 66 y.o. male.     Vitals:    02/10/22 0946   BP: 110/70   Pulse: 87   Resp: 15   Temp: 98.2 °F (36.8 °C)   SpO2: 99%      Body mass index is 27.13 kg/m².     History of Present Illness   Patient was seen for degenerative changes in the cervical spine.  Patient had eye surgery and needed an EKG.  EKG read atrial enlargement but that was a computer read.  Do not see where a cardiologist actually read it.  Patient was informed we could do a chest x-ray or echo to find out but he does not want to.  Patient is nonsymptomatic and does not see the reason.  Patient does have severe degenerative disease of his cervical spine.  Patient has osteophytes with narrowed disc spaces with spinal stenosis.  Patient while lying down can have electrical shocks going down his arm.  Patient not want to go to physical therapy or see a spine specialist.  Patient does have a history of diverticulosis was been stable over the past several months.  There is no GI bleeding or abdominal cramping.  Patient does have a history of anxiety.  Patient is concerned about his finances.  Patient had been on Xanax and Prozac in the past.  Patient stated Xanax works but the Prozac did not help.  Patient was informed he could have Klonopin 0.5 mg twice daily as needed with sertraline 50 mg daily and he will follow up in 1 month.  Patient is not suicidal.  Blood pressures been running one tens over seventies.  She will continue lisinopril 10 mg daily.    The patient has read and signed the [facility] Controlled Substance Contract.  I will continue to see patient for regular follow up appointments.  They are well controlled on their medication.  RAMAKRISHNA has been reviewed by me and is updated every 3 months. The patient is aware of the potential for addiction and dependence.      Dictated utilizing Dragon dictation. If there are questions or for further clarification, please contact me.  The following portions of  the patient's history were reviewed and updated as appropriate: allergies, current medications, past family history, past medical history, past social history, past surgical history and problem list.    Review of Systems   Constitutional: Negative for fatigue and fever.   HENT: Positive for congestion. Negative for trouble swallowing.    Eyes: Negative for discharge and visual disturbance.   Respiratory: Negative for choking and shortness of breath.    Cardiovascular: Negative for chest pain and palpitations.   Gastrointestinal: Negative for abdominal pain and blood in stool.   Endocrine: Negative.    Genitourinary: Negative for genital sores and hematuria.   Musculoskeletal: Negative for gait problem and joint swelling.   Skin: Negative for color change, pallor, rash and wound.   Allergic/Immunologic: Positive for environmental allergies. Negative for immunocompromised state.   Neurological: Negative for facial asymmetry and speech difficulty.   Psychiatric/Behavioral: Negative for hallucinations and suicidal ideas. The patient is nervous/anxious.        Objective   Physical Exam  Vitals and nursing note reviewed.   Constitutional:       Appearance: Normal appearance. He is well-developed.   HENT:      Head: Normocephalic and atraumatic.      Nose: Nose normal.      Mouth/Throat:      Mouth: Mucous membranes are moist.      Pharynx: Oropharynx is clear.   Eyes:      Extraocular Movements: Extraocular movements intact.      Conjunctiva/sclera: Conjunctivae normal.      Pupils: Pupils are equal, round, and reactive to light.   Cardiovascular:      Rate and Rhythm: Normal rate and regular rhythm.      Heart sounds: Normal heart sounds. No murmur heard.  No friction rub. No gallop.    Pulmonary:      Effort: Pulmonary effort is normal. No respiratory distress.      Breath sounds: Normal breath sounds. No stridor. No wheezing, rhonchi or rales.   Chest:      Chest wall: No tenderness.   Abdominal:      General: Bowel  sounds are normal.      Palpations: Abdomen is soft.   Musculoskeletal:         General: Normal range of motion.      Cervical back: Normal range of motion and neck supple.   Skin:     General: Skin is warm and dry.   Neurological:      General: No focal deficit present.      Mental Status: He is alert and oriented to person, place, and time. Mental status is at baseline.   Psychiatric:         Behavior: Behavior normal.         Thought Content: Thought content normal.         Judgment: Judgment normal.      Comments: Patient has moderate anxiety, patient not want to see a psychiatrist.         Assessment/Plan 1 Klonopin 0.5 mg twice daily as needed, sertraline 50 mg daily.  #2 follow-up in 1 month #3 recommend patient check blood pressure monthly.  Problems Addressed this Visit        Cardiac and Vasculature    Essential hypertension       Gastrointestinal Abdominal     Diverticulosis       Mental Health    Anxiety       Neuro    DDD (degenerative disc disease), cervical - Primary      Diagnoses     Diagnosis Codes Comments    DDD (degenerative disc disease), cervical    -  Primary ICD-10-CM: M50.30  ICD-9-CM: 722.4     Essential hypertension     ICD-10-CM: I10  ICD-9-CM: 401.9     Diverticulosis     ICD-10-CM: K57.90  ICD-9-CM: 562.10     Anxiety     ICD-10-CM: F41.9  ICD-9-CM: 300.00

## 2022-02-28 ENCOUNTER — OFFICE VISIT (OUTPATIENT)
Dept: SLEEP MEDICINE | Facility: HOSPITAL | Age: 67
End: 2022-02-28

## 2022-02-28 VITALS
SYSTOLIC BLOOD PRESSURE: 120 MMHG | HEIGHT: 68 IN | WEIGHT: 176 LBS | HEART RATE: 68 BPM | BODY MASS INDEX: 26.67 KG/M2 | DIASTOLIC BLOOD PRESSURE: 61 MMHG | OXYGEN SATURATION: 97 %

## 2022-02-28 DIAGNOSIS — G47.33 OBSTRUCTIVE SLEEP APNEA: Primary | ICD-10-CM

## 2022-02-28 PROCEDURE — G0463 HOSPITAL OUTPT CLINIC VISIT: HCPCS

## 2022-03-15 ENCOUNTER — OFFICE VISIT (OUTPATIENT)
Dept: FAMILY MEDICINE CLINIC | Facility: CLINIC | Age: 67
End: 2022-03-15

## 2022-03-15 VITALS
TEMPERATURE: 98 F | HEART RATE: 67 BPM | WEIGHT: 179.4 LBS | RESPIRATION RATE: 15 BRPM | HEIGHT: 68 IN | BODY MASS INDEX: 27.19 KG/M2 | SYSTOLIC BLOOD PRESSURE: 120 MMHG | OXYGEN SATURATION: 98 % | DIASTOLIC BLOOD PRESSURE: 70 MMHG

## 2022-03-15 DIAGNOSIS — M50.30 DDD (DEGENERATIVE DISC DISEASE), CERVICAL: ICD-10-CM

## 2022-03-15 DIAGNOSIS — Z00.00 MEDICARE ANNUAL WELLNESS VISIT, SUBSEQUENT: Primary | ICD-10-CM

## 2022-03-15 DIAGNOSIS — F41.1 GENERALIZED ANXIETY DISORDER: ICD-10-CM

## 2022-03-15 DIAGNOSIS — I10 ESSENTIAL HYPERTENSION: ICD-10-CM

## 2022-03-15 PROCEDURE — G0439 PPPS, SUBSEQ VISIT: HCPCS | Performed by: INTERNAL MEDICINE

## 2022-03-15 PROCEDURE — 99214 OFFICE O/P EST MOD 30 MIN: CPT | Performed by: INTERNAL MEDICINE

## 2022-03-15 PROCEDURE — 1159F MED LIST DOCD IN RCRD: CPT | Performed by: INTERNAL MEDICINE

## 2022-03-15 PROCEDURE — 1170F FXNL STATUS ASSESSED: CPT | Performed by: INTERNAL MEDICINE

## 2022-03-15 PROCEDURE — 1126F AMNT PAIN NOTED NONE PRSNT: CPT | Performed by: INTERNAL MEDICINE

## 2022-03-15 NOTE — PROGRESS NOTES
QUICK REFERENCE INFORMATION:  The ABCs of the Annual Wellness Visit    Subsequent Medicare Wellness Visit patient was seen for Medicare wellness exam.  Patient was seen for generalized anxiety disorder.  Patient was taken off his Xanax and plain on clonazepam 0.5 mg twice daily, sertraline 50 mg daily.  Patient states his anxiety is completely gone and he is able to get up and walk and exercise.  Patient's blood pressures been running 120s over 70s.  Patient will continue lisinopril 10 mg daily, amlodipine 5 mg daily.  Patient will continue monitoring at home.  Patient does have degenerative joint disease and is using over-the-counter medications.  Patient's been stable with this treatment.    Dictated utilizing Dragon dictation. If there are questions or for further clarification, please contact me.    HEALTH RISK ASSESSMENT    1955    Recent Hospitalizations:  No hospitalization(s) within the last year..        Current Medical Providers:  Patient Care Team:  Patrick Licona MD as PCP - General (Internal Medicine)  Luis Paige MD as Consulting Physician (Pulmonary Disease)  Jerome Baldwin MD as Consulting Physician (Gastroenterology)  Gage Mo MD as Consulting Physician (Cardiology)        Smoking Status:  Social History     Tobacco Use   Smoking Status Former Smoker   • Packs/day: 2.00   • Years: 46.00   • Pack years: 92.00   • Types: Cigarettes   • Quit date: 2019   • Years since quittin.8   Smokeless Tobacco Never Used   Tobacco Comment    not interested  quit w hypnosis for a while       Alcohol Consumption:  Social History     Substance and Sexual Activity   Alcohol Use Yes       Depression Screen:   PHQ-2/PHQ-9 Depression Screening 3/15/2022   Retired PHQ-9 Total Score -   Retired Total Score -   Little Interest or Pleasure in Doing Things 0-->not at all   Feeling Down, Depressed or Hopeless 0-->not at all   PHQ-9: Brief Depression Severity Measure Score 0       Health Habits and  Functional and Cognitive Screening:  Functional & Cognitive Status 3/15/2022   Do you have difficulty preparing food and eating? No   Do you have difficulty bathing yourself, getting dressed or grooming yourself? No   Do you have difficulty using the toilet? No   Do you have difficulty moving around from place to place? No   Do you have trouble with steps or getting out of a bed or a chair? No   Current Diet Well Balanced Diet   Dental Exam Not up to date   Eye Exam Up to date   Exercise (times per week) 5 times per week   Current Exercises Include Aerobics   Do you need help using the phone?  No   Are you deaf or do you have serious difficulty hearing?  No   Do you need help with transportation? No   Do you need help shopping? No   Do you need help preparing meals?  No   Do you need help with housework?  No   Do you need help with laundry? No   Do you need help taking your medications? No   Do you need help managing money? No   Do you ever drive or ride in a car without wearing a seat belt? No   Have you felt unusual stress, anger or loneliness in the last month? No   Who do you live with? Alone   If you need help, do you have trouble finding someone available to you? No   Have you been bothered in the last four weeks by sexual problems? No   Do you have difficulty concentrating, remembering or making decisions? No           Does the patient have evidence of cognitive impairment? No    Aspirin use counseling: Taking ASA appropriately as indicated      Recent Lab Results:  CMP:  Lab Results   Component Value Date    BUN 12 01/05/2022    CREATININE 0.87 01/05/2022    EGFRIFNONA 88 01/05/2022    BCR 13.8 01/05/2022     01/05/2022    K 4.3 01/05/2022    CO2 26.8 01/05/2022    CALCIUM 8.9 01/05/2022    ALBUMIN 4.80 12/09/2021    BILITOT 0.4 12/09/2021    ALKPHOS 91 12/09/2021    AST 14 12/09/2021    ALT 13 12/09/2021     Lipid Panel:  Lab Results   Component Value Date    CHOL 174 12/09/2021    TRIG 134 12/09/2021     HDL 40 12/09/2021    VLDL 24 12/09/2021    LDLHDL 2.68 12/09/2021     HbA1c:       Visual Acuity:  No exam data present    Age-appropriate Screening Schedule:  Refer to the list below for future screening recommendations based on patient's age, sex and/or medical conditions. Orders for these recommended tests are listed in the plan section. The patient has been provided with a written plan.    Health Maintenance   Topic Date Due   • ZOSTER VACCINE (1 of 2) Never done   • INFLUENZA VACCINE  12/09/2022 (Originally 8/1/2021)   • TDAP/TD VACCINES (2 - Td or Tdap) 12/03/2025        Subjective   History of Present Illness    Maximus Bhardwaj Sr. is a 66 y.o. male who presents for an Subsequent Wellness Visit.    The following portions of the patient's history were reviewed and updated as appropriate: allergies, current medications, past family history, past medical history, past social history, past surgical history and problem list.    Outpatient Medications Prior to Visit   Medication Sig Dispense Refill   • amLODIPine (NORVASC) 5 MG tablet Take 5 mg by mouth Daily.     • aspirin 81 MG EC tablet Take 81 mg by mouth Daily.     • atorvastatin (LIPITOR) 10 MG tablet Take 1 tablet by mouth Every Night. 90 tablet 1   • clonazePAM (KlonoPIN) 0.5 MG tablet Take 1 tablet by mouth 2 (Two) Times a Day As Needed for Anxiety. 60 tablet 1   • lisinopril (PRINIVIL,ZESTRIL) 10 MG tablet Take 1 tablet by mouth Daily. FOR BLOOD PRESSURE 90 tablet 0   • Omega-3 Fatty Acids (fish oil) 1000 MG capsule capsule Take  by mouth Daily With Breakfast.     • sertraline (Zoloft) 50 MG tablet Take 1 tablet by mouth Daily. 30 tablet 3   • omeprazole (priLOSEC) 40 MG capsule      • ondansetron (Zofran) 8 MG tablet Take 1 tablet by mouth Every 8 (Eight) Hours As Needed for Nausea or Vomiting. 30 tablet 3   • ALPRAZolam (XANAX) 0.5 MG tablet Take 1 tablet by mouth 2 (Two) Times a Day As Needed for Anxiety. for anxiety 60 tablet 0     No  facility-administered medications prior to visit.       Patient Active Problem List   Diagnosis   • Essential hypertension   • Generalized anxiety disorder   • Former smoker   • Pulmonary nodule   • Meningioma (HCC)   • Neck pain   • DDD (degenerative disc disease), cervical   • Diverticulosis   • Macular degeneration   • Anxiety       Advance Care Planning:  ACP discussion was held with the patient during this visit. Patient does not have an advance directive, information provided.    Identification of Risk Factors:  Risk factors include: Advance Directive Discussion.    Review of Systems   Constitutional: Negative for fatigue and fever.   HENT: Positive for congestion. Negative for trouble swallowing.    Eyes: Negative for discharge and visual disturbance.   Respiratory: Negative for choking and shortness of breath.    Cardiovascular: Negative for chest pain and palpitations.   Gastrointestinal: Negative for abdominal pain and blood in stool.   Endocrine: Negative.    Genitourinary: Negative for genital sores and hematuria.   Musculoskeletal: Negative for gait problem and joint swelling.   Skin: Negative for color change, pallor, rash and wound.   Allergic/Immunologic: Positive for environmental allergies. Negative for immunocompromised state.   Neurological: Negative for facial asymmetry and speech difficulty.   Psychiatric/Behavioral: Negative for hallucinations and suicidal ideas. The patient is nervous/anxious.        Compared to one year ago, the patient feels his physical health is the same.  Compared to one year ago, the patient feels his mental health is the same.    Objective     Physical Exam  Vitals and nursing note reviewed.   Constitutional:       Appearance: Normal appearance. He is well-developed.   HENT:      Head: Normocephalic and atraumatic.      Nose: Nose normal.      Mouth/Throat:      Mouth: Mucous membranes are moist.      Pharynx: Oropharynx is clear.   Eyes:      Extraocular Movements:  "Extraocular movements intact.      Conjunctiva/sclera: Conjunctivae normal.      Pupils: Pupils are equal, round, and reactive to light.   Cardiovascular:      Rate and Rhythm: Normal rate and regular rhythm.      Heart sounds: Normal heart sounds. No murmur heard.    No friction rub. No gallop.   Pulmonary:      Effort: Pulmonary effort is normal. No respiratory distress.      Breath sounds: Normal breath sounds. No stridor. No wheezing, rhonchi or rales.   Chest:      Chest wall: No tenderness.   Abdominal:      General: Bowel sounds are normal.      Palpations: Abdomen is soft.   Musculoskeletal:         General: Normal range of motion.      Cervical back: Normal range of motion and neck supple.   Skin:     General: Skin is warm and dry.   Neurological:      General: No focal deficit present.      Mental Status: He is alert and oriented to person, place, and time. Mental status is at baseline.   Psychiatric:         Behavior: Behavior normal.         Thought Content: Thought content normal.         Judgment: Judgment normal.      Comments: Patient was seen for general anxiety disorder.         Vitals:    03/15/22 1552   BP: 120/70   BP Location: Left arm   Patient Position: Sitting   Cuff Size: Adult   Pulse: 67   Resp: 15   Temp: 98 °F (36.7 °C)   TempSrc: Infrared   SpO2: 98%   Weight: 81.4 kg (179 lb 6.4 oz)   Height: 172.7 cm (67.99\")   PainSc: 0-No pain       Patient's Body mass index is 27.28 kg/m². indicating that he is within normal range (BMI 18.5-24.9). No BMI management plan needed..      Assessment/Plan #1 continue clonazepam and  sertraline No. 2 continue present diet and activity levels #3 continue monitoring blood pressure  Patient Self-Management and Personalized Health Advice  The patient has been provided with information about: NA and preventive services including:   · NA.    Visit Diagnoses:    ICD-10-CM ICD-9-CM   1. Medicare annual wellness visit, subsequent  Z00.00 V70.0   2. Essential " hypertension  I10 401.9   3. DDD (degenerative disc disease), cervical  M50.30 722.4   4. Generalized anxiety disorder  F41.1 300.02       No orders of the defined types were placed in this encounter.      Outpatient Encounter Medications as of 3/15/2022   Medication Sig Dispense Refill   • amLODIPine (NORVASC) 5 MG tablet Take 5 mg by mouth Daily.     • aspirin 81 MG EC tablet Take 81 mg by mouth Daily.     • atorvastatin (LIPITOR) 10 MG tablet Take 1 tablet by mouth Every Night. 90 tablet 1   • clonazePAM (KlonoPIN) 0.5 MG tablet Take 1 tablet by mouth 2 (Two) Times a Day As Needed for Anxiety. 60 tablet 1   • lisinopril (PRINIVIL,ZESTRIL) 10 MG tablet Take 1 tablet by mouth Daily. FOR BLOOD PRESSURE 90 tablet 0   • Omega-3 Fatty Acids (fish oil) 1000 MG capsule capsule Take  by mouth Daily With Breakfast.     • sertraline (Zoloft) 50 MG tablet Take 1 tablet by mouth Daily. 30 tablet 3   • omeprazole (priLOSEC) 40 MG capsule      • ondansetron (Zofran) 8 MG tablet Take 1 tablet by mouth Every 8 (Eight) Hours As Needed for Nausea or Vomiting. 30 tablet 3   • [DISCONTINUED] ALPRAZolam (XANAX) 0.5 MG tablet Take 1 tablet by mouth 2 (Two) Times a Day As Needed for Anxiety. for anxiety 60 tablet 0     No facility-administered encounter medications on file as of 3/15/2022.       Reviewed use of high risk medication in the elderly: not applicable  Reviewed for potential of harmful drug interactions in the elderly: not applicable    Follow Up:  Return in about 4 months (around 7/15/2022), or if symptoms worsen or fail to improve, for Recheck.     An After Visit Summary and PPPS with all of these plans were given to the patient.

## 2022-04-12 RX ORDER — CLONAZEPAM 0.5 MG/1
TABLET ORAL
Qty: 60 TABLET | Refills: 4 | Status: SHIPPED | OUTPATIENT
Start: 2022-04-12 | End: 2022-09-06

## 2022-06-13 RX ORDER — ATORVASTATIN CALCIUM 10 MG/1
TABLET, FILM COATED ORAL
Qty: 90 TABLET | Refills: 1 | Status: SHIPPED | OUTPATIENT
Start: 2022-06-13 | End: 2022-12-07

## 2022-06-16 PROBLEM — U07.1 COVID-19 VIRUS DETECTED: Status: ACTIVE | Noted: 2022-06-16

## 2022-06-20 ENCOUNTER — TELEPHONE (OUTPATIENT)
Dept: FAMILY MEDICINE CLINIC | Facility: CLINIC | Age: 67
End: 2022-06-20

## 2022-06-20 NOTE — TELEPHONE ENCOUNTER
Patient has Covid and his BP today is 76/53. Pt states that it has been running low and  he is on two different BP medications.

## 2022-06-20 NOTE — TELEPHONE ENCOUNTER
Patient is on Amlodipine 5mg daily and Lisinopril 10mg daily, he has Covid and his b/p has been dropping he did telemed visit last week and his pressure has been dropping.He usually takes Lisinopril in am and Amlodipine in evening

## 2022-06-20 NOTE — TELEPHONE ENCOUNTER
Hold off on both blood pressure pills for now.  Monitor blood pressure very closely.  See what it is running over the next 2 days.  Go to emergency room if any problems.

## 2022-06-27 ENCOUNTER — TELEPHONE (OUTPATIENT)
Dept: FAMILY MEDICINE CLINIC | Facility: CLINIC | Age: 67
End: 2022-06-27

## 2022-06-27 NOTE — TELEPHONE ENCOUNTER
Pt states he tested positive for Covid 2 weeks ago, spoke with Dr. Licona then but said everything he suggested pt to do for sx has not worked. Wants to know what else he can do?

## 2022-06-28 ENCOUNTER — OFFICE VISIT (OUTPATIENT)
Dept: FAMILY MEDICINE CLINIC | Facility: CLINIC | Age: 67
End: 2022-06-28

## 2022-06-28 VITALS
TEMPERATURE: 97 F | SYSTOLIC BLOOD PRESSURE: 124 MMHG | HEIGHT: 68 IN | OXYGEN SATURATION: 98 % | BODY MASS INDEX: 25.01 KG/M2 | WEIGHT: 165 LBS | HEART RATE: 60 BPM | RESPIRATION RATE: 18 BRPM | DIASTOLIC BLOOD PRESSURE: 70 MMHG

## 2022-06-28 DIAGNOSIS — R05.8 PRODUCTIVE COUGH: Primary | ICD-10-CM

## 2022-06-28 DIAGNOSIS — E55.9 VITAMIN D DEFICIENCY: ICD-10-CM

## 2022-06-28 DIAGNOSIS — I10 ESSENTIAL HYPERTENSION: ICD-10-CM

## 2022-06-28 LAB
25(OH)D3 SERPL-MCNC: 60.2 NG/ML (ref 30–100)
ALBUMIN SERPL-MCNC: 3.9 G/DL (ref 3.5–5.2)
ALBUMIN/GLOB SERPL: 1.1 G/DL
ALP SERPL-CCNC: 89 U/L (ref 39–117)
ALT SERPL W P-5'-P-CCNC: 15 U/L (ref 1–41)
ANION GAP SERPL CALCULATED.3IONS-SCNC: 11 MMOL/L (ref 5–15)
AST SERPL-CCNC: 18 U/L (ref 1–40)
BILIRUB SERPL-MCNC: 0.3 MG/DL (ref 0–1.2)
BUN SERPL-MCNC: 11 MG/DL (ref 8–23)
BUN/CREAT SERPL: 12.5 (ref 7–25)
CALCIUM SPEC-SCNC: 9.4 MG/DL (ref 8.6–10.5)
CHLORIDE SERPL-SCNC: 101 MMOL/L (ref 98–107)
CHOLEST SERPL-MCNC: 155 MG/DL (ref 0–200)
CO2 SERPL-SCNC: 26 MMOL/L (ref 22–29)
CREAT SERPL-MCNC: 0.88 MG/DL (ref 0.76–1.27)
DEPRECATED RDW RBC AUTO: 41.2 FL (ref 37–54)
EGFRCR SERPLBLD CKD-EPI 2021: 94.8 ML/MIN/1.73
ERYTHROCYTE [DISTWIDTH] IN BLOOD BY AUTOMATED COUNT: 13.1 % (ref 12.3–15.4)
GLOBULIN UR ELPH-MCNC: 3.4 GM/DL
GLUCOSE SERPL-MCNC: 91 MG/DL (ref 65–99)
HCT VFR BLD AUTO: 41.3 % (ref 37.5–51)
HDLC SERPL-MCNC: 27 MG/DL (ref 40–60)
HGB BLD-MCNC: 14.2 G/DL (ref 13–17.7)
LDLC SERPL CALC-MCNC: 106 MG/DL (ref 0–100)
LDLC/HDLC SERPL: 3.84 {RATIO}
MCH RBC QN AUTO: 30.2 PG (ref 26.6–33)
MCHC RBC AUTO-ENTMCNC: 34.4 G/DL (ref 31.5–35.7)
MCV RBC AUTO: 87.9 FL (ref 79–97)
PLATELET # BLD AUTO: 347 10*3/MM3 (ref 140–450)
PMV BLD AUTO: 10.7 FL (ref 6–12)
POTASSIUM SERPL-SCNC: 5.2 MMOL/L (ref 3.5–5.2)
PROT SERPL-MCNC: 7.3 G/DL (ref 6–8.5)
RBC # BLD AUTO: 4.7 10*6/MM3 (ref 4.14–5.8)
SODIUM SERPL-SCNC: 138 MMOL/L (ref 136–145)
TRIGL SERPL-MCNC: 121 MG/DL (ref 0–150)
VLDLC SERPL-MCNC: 22 MG/DL (ref 5–40)
WBC NRBC COR # BLD: 7.66 10*3/MM3 (ref 3.4–10.8)

## 2022-06-28 PROCEDURE — 80053 COMPREHEN METABOLIC PANEL: CPT | Performed by: INTERNAL MEDICINE

## 2022-06-28 PROCEDURE — 82306 VITAMIN D 25 HYDROXY: CPT | Performed by: INTERNAL MEDICINE

## 2022-06-28 PROCEDURE — 71046 X-RAY EXAM CHEST 2 VIEWS: CPT | Performed by: NURSE PRACTITIONER

## 2022-06-28 PROCEDURE — 80061 LIPID PANEL: CPT | Performed by: INTERNAL MEDICINE

## 2022-06-28 PROCEDURE — 99214 OFFICE O/P EST MOD 30 MIN: CPT | Performed by: NURSE PRACTITIONER

## 2022-06-28 PROCEDURE — 36415 COLL VENOUS BLD VENIPUNCTURE: CPT | Performed by: NURSE PRACTITIONER

## 2022-06-28 PROCEDURE — 85027 COMPLETE CBC AUTOMATED: CPT | Performed by: INTERNAL MEDICINE

## 2022-06-28 RX ORDER — ASCORBIC ACID 500 MG
500 TABLET ORAL DAILY
COMMUNITY
End: 2022-08-23 | Stop reason: ALTCHOICE

## 2022-06-28 NOTE — PROGRESS NOTES
"Chief Complaint  Cough (Dx with Covid 2 week ago given antibiotic and steroids at onset but cough is getting worse cough productive now/concerned about pneumonia), aching all over, and Hypertension (Had telemed visit with RLS 6/16 advised to hold Amlodipine and Lisinopril and monitor b/p his readings were lower after covid)    Subjective        Maximus Bhardwaj  presents to [facility]  Patient presents to the office today for post  COVID complaints of a productive cough. He was treated with antibiotic and steroids 2 weeks ago but is having a continuous cough.  Patient is still reports fatigue and body aches.  Since COVID patient's blood pressure has been normal and even on the low side.  He has recently had a telehealth visit with Dr. Licona on 616 and was advised to hold his amlodipine and lisinopril and monitor BP.  Due to low blood pressure patient is still not taking medication.  He denies chest pain shortness of air.  Blood pressure today is 124/70.  He denies shortness of air at this time.      Objective   Vital Signs:  /70 (BP Location: Left arm, Patient Position: Sitting)   Pulse 60   Temp 97 °F (36.1 °C) (Infrared)   Resp 18   Ht 172.7 cm (67.99\")   Wt 74.8 kg (165 lb)   SpO2 98%   BMI 25.10 kg/m²   Estimated body mass index is 25.1 kg/m² as calculated from the following:    Height as of this encounter: 172.7 cm (67.99\").    Weight as of this encounter: 74.8 kg (165 lb).          Physical Exam  Constitutional:       General: He is not in acute distress.     Appearance: Normal appearance.   Eyes:      Pupils: Pupils are equal, round, and reactive to light.   Cardiovascular:      Rate and Rhythm: Normal rate and regular rhythm.      Pulses: Normal pulses.      Heart sounds: Normal heart sounds.   Pulmonary:      Effort: Pulmonary effort is normal.      Breath sounds: Normal breath sounds. No wheezing or rales.   Neurological:      Mental Status: He is alert. "   Psychiatric:         Mood and Affect: Mood normal.         Behavior: Behavior normal.        Result Review :                Assessment and Plan   Diagnoses and all orders for this visit:    1. Productive cough (Primary)  Assessment & Plan:  Reviewed chest x-ray no obvious pneumonia radiology to read final report.    Orders:  -     XR Chest PA & Lateral (In Office)    2. Essential hypertension  Assessment & Plan:  Patient reports since his recent covid infection bp has been low   Pt has stopped bp meds per Dr Licona and BP is normal he is watching diet and exercise       Orders:  -     CBC (No Diff)  -     Comprehensive Metabolic Panel  -     Lipid Panel    3. Vitamin D deficiency  -     Vitamin D 25 hydroxy           Follow Up   Return in about 2 weeks (around 7/12/2022).  Patient was given instructions and counseling regarding his condition or for health maintenance advice. Please see specific information pulled into the AVS if appropriate.

## 2022-06-28 NOTE — ASSESSMENT & PLAN NOTE
Patient reports since his recent covid infection bp has been low   Pt has stopped bp meds per Dr Licona and BP is normal he is watching diet and exercise

## 2022-06-29 PROBLEM — R05.8 PRODUCTIVE COUGH: Status: ACTIVE | Noted: 2022-06-29

## 2022-06-29 PROBLEM — E55.9 VITAMIN D DEFICIENCY: Status: ACTIVE | Noted: 2022-06-29

## 2022-07-08 ENCOUNTER — TELEPHONE (OUTPATIENT)
Dept: FAMILY MEDICINE CLINIC | Facility: CLINIC | Age: 67
End: 2022-07-08

## 2022-07-08 NOTE — TELEPHONE ENCOUNTER
PT CALLED CONCERNED ABOUT HIS BP, PT STATED HE HAD COVID RECENTLY  AND BP DROPPED AND WAS PUT ON BP MEDICINE BUT WAS ADVISED TO STOP TAKING. NOW HIS BP IS SLOWLY GETTING HIGH AGAIN, HE STATES TODAY IT /71. REQUESTING A CALL BACK!

## 2022-07-08 NOTE — TELEPHONE ENCOUNTER
Need to know what the blood pressure pill is.  Recommend to hold blood pressure pill until he is blood pressure gets above 140 on the top or 90 on the bottom.  Needs to monitor blood pressure daily and call in numbers next week.  If patient gets worse needs to go to the emergency room.  Also recommend to hydrate

## 2022-07-12 ENCOUNTER — OFFICE VISIT (OUTPATIENT)
Dept: FAMILY MEDICINE CLINIC | Facility: CLINIC | Age: 67
End: 2022-07-12

## 2022-07-12 VITALS
BODY MASS INDEX: 26.49 KG/M2 | WEIGHT: 174.8 LBS | TEMPERATURE: 97.5 F | SYSTOLIC BLOOD PRESSURE: 120 MMHG | HEART RATE: 63 BPM | OXYGEN SATURATION: 99 % | HEIGHT: 68 IN | DIASTOLIC BLOOD PRESSURE: 80 MMHG

## 2022-07-12 DIAGNOSIS — I10 ESSENTIAL HYPERTENSION: Primary | ICD-10-CM

## 2022-07-12 DIAGNOSIS — R93.89 ABNORMAL CHEST X-RAY: ICD-10-CM

## 2022-07-12 DIAGNOSIS — F41.1 GENERALIZED ANXIETY DISORDER: ICD-10-CM

## 2022-07-12 PROCEDURE — 99213 OFFICE O/P EST LOW 20 MIN: CPT | Performed by: NURSE PRACTITIONER

## 2022-07-12 NOTE — PROGRESS NOTES
"Chief Complaint  Follow-up (Covid and low BP)    Subjective        Maximus Bhardwaj . presents to [facility]  Patient presents to the office today for a follow up from covid and a blood pressure check . Patient reports he has had improved blood pressures. He brought bp log to office visit today. Blood pressure is 120/80. Blood pressure log is wnl. Discussed with patient to continue DASH diet. He denies cp soa. Patient has improved from covid. Patient has stopped smoking. Patient had a recent chest xray with abnormal results showing pulmonary arterial enlargement.             Objective   Vital Signs:  /80 (BP Location: Left arm, Patient Position: Sitting, Cuff Size: Adult)   Pulse 63   Temp 97.5 °F (36.4 °C) (Infrared)   Ht 172.7 cm (67.99\")   Wt 79.3 kg (174 lb 12.8 oz)   SpO2 99%   BMI 26.58 kg/m²   Estimated body mass index is 26.58 kg/m² as calculated from the following:    Height as of this encounter: 172.7 cm (67.99\").    Weight as of this encounter: 79.3 kg (174 lb 12.8 oz).    BMI is >= 25 and <30. (Overweight) The following options were offered after discussion;: exercise counseling/recommendations and nutrition counseling/recommendations      Physical Exam  Constitutional:       General: He is not in acute distress.     Appearance: Normal appearance.   Eyes:      Pupils: Pupils are equal, round, and reactive to light.   Cardiovascular:      Rate and Rhythm: Normal rate and regular rhythm.      Pulses: Normal pulses.      Heart sounds: Normal heart sounds.   Pulmonary:      Effort: Pulmonary effort is normal.      Breath sounds: Normal breath sounds. No wheezing or rales.   Neurological:      Mental Status: He is alert.   Psychiatric:         Mood and Affect: Mood normal.         Behavior: Behavior normal.        Result Review :  The following data was reviewed by: TERI Ellis on 07/12/2022:  Common labs    Common Labsle 12/9/21 12/9/21 12/9/21 " 1/5/22 1/5/22 6/28/22 6/28/22 6/28/22    1143 1143 1143 1140 1140 1347 1347 1347   Glucose  101 (A)   87  91    BUN  16   12  11    Creatinine  0.76   0.87  0.88    eGFR Non African Am  103   88      Sodium  139   143  138    Potassium  4.4   4.3  5.2    Chloride  101   105  101    Calcium  9.3   8.9  9.4    Albumin  4.80     3.90    Total Bilirubin  0.4     0.3    Alkaline Phosphatase  91     89    AST (SGOT)  14     18    ALT (SGPT)  13     15    WBC 6.39   6.68  7.66     Hemoglobin 15.0   14.0  14.2     Hematocrit 45.6   40.9  41.3     Platelets 252   259  347     Total Cholesterol   174     155   Triglycerides   134     121   HDL Cholesterol   40     27 (A)   LDL Cholesterol    110 (A)     106 (A)   (A) Abnormal value            Data reviewed: Radiologic studies chest xray           Assessment and Plan   Diagnoses and all orders for this visit:    1. Essential hypertension (Primary)  Assessment & Plan:  HTN is stable no meds diet and exercise controlled  Pt stopped smoking;        2. Generalized anxiety disorder  Assessment & Plan:  Stable denies si hi      3. Abnormal chest x-ray  Assessment & Plan:  Pulmonary arterial enlargement noted will refer to cardiology    Orders:  -     Ambulatory Referral to Cardiology         I spent 15 minutes caring for Maximus on this date of service. This time includes time spent by me in the following activities:preparing for the visit, reviewing tests, obtaining and/or reviewing a separately obtained history, performing a medically appropriate examination and/or evaluation , counseling and educating the patient/family/caregiver and ordering medications, tests, or procedures  Follow Up   Return for Medicare Wellness.  Patient was given instructions and counseling regarding his condition or for health maintenance advice. Please see specific information pulled into the AVS if appropriate.

## 2022-07-13 PROBLEM — R93.89 ABNORMAL CHEST X-RAY: Status: ACTIVE | Noted: 2022-07-13

## 2022-08-23 ENCOUNTER — OFFICE VISIT (OUTPATIENT)
Dept: CARDIOLOGY | Facility: CLINIC | Age: 67
End: 2022-08-23

## 2022-08-23 VITALS
SYSTOLIC BLOOD PRESSURE: 122 MMHG | BODY MASS INDEX: 27.28 KG/M2 | DIASTOLIC BLOOD PRESSURE: 90 MMHG | WEIGHT: 180 LBS | HEIGHT: 68 IN | HEART RATE: 61 BPM

## 2022-08-23 DIAGNOSIS — I10 ESSENTIAL HYPERTENSION: Primary | ICD-10-CM

## 2022-08-23 PROCEDURE — 99204 OFFICE O/P NEW MOD 45 MIN: CPT | Performed by: INTERNAL MEDICINE

## 2022-08-23 NOTE — PROGRESS NOTES
Subjective:     Encounter Date:08/23/2022      Patient ID: Maximus Bhardwaj Sr. is a 67 y.o. male.    Chief Complaint: Diastolic dysfunction  HPI:   This is a 67-year-old man who was previously treated for hypertension and grade 1 diastolic dysfunction by my father Dr. Gage Mo.  He has not been seen in a few years but was taking lisinopril and amlodipine regularly.  He then quit smoking about a year ago and eventually stopped all nicotine products when he contracted COVID in June 22.  At that time he noticed a drop in his blood pressures with systolics in the 70s to 80s.  He was advised to stop his antihypertensives.  Since then he has been keeping close track of his blood pressure and have been in the 110s systolic.  He walks about 2 hours a day.  He lifts 20 pound weights.  He has no angina or dyspnea.  In the past he has had a normal nuclear stress test.  Echocardiogram showing grade 1 diastolic dysfunction.  He has had intermittent chest pain in the past but this has been related to anxiety and is well controlled on his current Zoloft therapy.  He is a former smoker.  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    The following portions of the patient's history were reviewed and updated as appropriate: allergies, current medications, past family history, past medical history, past social history, past surgical history and problem list.     REVIEW OF SYSTEMS:   All systems reviewed.  Pertinent positives identified in HPI.  All other systems are negative.    Past Medical History:   Diagnosis Date   • Bladder tumor    • Sleep apnea        Family History   Problem Relation Age of Onset   • Hypertension Mother    • Allergies Mother    • Heart disease Father 49   • Alcohol abuse Father    • Heart attack Father        Social History     Socioeconomic History   • Marital status:    • Number of children: 1   Tobacco Use   • Smoking status: Former Smoker     Packs/day: 2.00      Years: 46.00     Pack years: 92.00     Types: Cigarettes     Quit date: 4/28/2019     Years since quitting: 3.3   • Smokeless tobacco: Never Used   • Tobacco comment: not interested  quit w hypnosis for a while   Vaping Use   • Vaping Use: Former   • Start date: 4/28/2019   Substance and Sexual Activity   • Alcohol use: Yes   • Drug use: Defer   • Sexual activity: Defer       No Known Allergies    Past Surgical History:   Procedure Laterality Date   • COLONOSCOPY  08/26/2015    Jerome Baldwin MD   • CYSTOSCOPY  08/19/2014    Roberto Smith M.D.       Procedures       Objective:         PHYSICAL EXAM:  GEN: VSS, no distress,   Eyes: normal sclera, normal lids and lashes  HENT: moist mucus membranes,   Respiratory: CTAB, no rales or wheezes  CV: RRR, no murmurs, , +2 DP and 2+ carotid pulses b/l  GI: NABS, soft,  Nontender, nondistended  MSK: no edema, no scoliosis or kyphosis  Skin: no rash, warm, dry  Heme/Lymph: no bruising or bleeding  Psych: organized thought, normal behavior and affect  Neuro: Cranial nerves grossly intact, Alert and Oriented x 3.         Assessment:          Diagnosis Plan   1. Essential hypertension            Plan:       1.  Hypertension: Now normotensive off drugs.  Monitor  2.  Grade 1 diastolic dysfunction: As noted on echocardiogram several years ago.  Euvolemic.  3.  Hyperlipidemia: Lipitor 10.    Dr. Licona, thank you very much for referring this kind patient to me. Please call me with any questions or concerns. I will see the patient again in the office in 1 year.         Camila Mo MD  08/23/22  [facility]    Outpatient Encounter Medications as of 8/23/2022   Medication Sig Dispense Refill   • atorvastatin (LIPITOR) 10 MG tablet TAKE ONE TABLET BY MOUTH ONCE NIGHTLY 90 tablet 1   • clonazePAM (KlonoPIN) 0.5 MG tablet TAKE ONE TABLET BY MOUTH TWICE A DAY AS NEEDED FOR ANXIETY 60 tablet 4   • sertraline (ZOLOFT) 50 MG tablet TAKE ONE TABLET BY MOUTH DAILY 30  tablet 3   • [DISCONTINUED] aspirin 81 MG EC tablet Take 81 mg by mouth Daily.     • [DISCONTINUED] Omega-3 Fatty Acids (fish oil) 1000 MG capsule capsule Take  by mouth Daily With Breakfast.     • [DISCONTINUED] omeprazole (priLOSEC) 40 MG capsule      • [DISCONTINUED] vitamin C (ASCORBIC ACID) 500 MG tablet Take 500 mg by mouth Daily.     • [DISCONTINUED] ZINC CITRATE PO Take  by mouth.       No facility-administered encounter medications on file as of 8/23/2022.

## 2022-09-06 RX ORDER — CLONAZEPAM 0.5 MG/1
TABLET ORAL
Qty: 60 TABLET | Refills: 3 | Status: SHIPPED | OUTPATIENT
Start: 2022-09-06 | End: 2023-01-04

## 2022-09-07 ENCOUNTER — HOSPITAL ENCOUNTER (OUTPATIENT)
Dept: MRI IMAGING | Facility: HOSPITAL | Age: 67
Discharge: HOME | End: 2022-09-07
Admitting: NEUROLOGICAL SURGERY

## 2022-09-07 DIAGNOSIS — D32.9 MENINGIOMA: ICD-10-CM

## 2022-09-07 PROCEDURE — A9577 INJ MULTIHANCE: HCPCS | Performed by: NEUROLOGICAL SURGERY

## 2022-09-07 PROCEDURE — 0 GADOBENATE DIMEGLUMINE 529 MG/ML SOLUTION: Performed by: NEUROLOGICAL SURGERY

## 2022-09-07 PROCEDURE — 70553 MRI BRAIN STEM W/O & W/DYE: CPT

## 2022-09-07 PROCEDURE — 82565 ASSAY OF CREATININE: CPT

## 2022-09-07 RX ADMIN — GADOBENATE DIMEGLUMINE 17 ML: 529 INJECTION, SOLUTION INTRAVENOUS at 10:14

## 2022-09-08 LAB — CREAT BLDA-MCNC: 0.9 MG/DL (ref 0.6–1.3)

## 2022-09-14 NOTE — PROGRESS NOTES
"Subjective   History of Present Illness: Maximus Bhardwaj Sr. is a 67 y.o. male is here today for follow-up with a new brain MRI that was ordered to follow his meningioma.  He states that over the last year his issues with nausea have resolved with treatment for a bacterial infection in his stomach.  He has had no new medical issues since I saw him last year.  Mr. Bhardwaj reports no headaches, dizziness or visual problems.     While in the room and during my examination of the patient I wore a mask and eye protection.  I washed my hands before and after this patient encounter.  The patient was also wearing a mask.    History of Present Illness    The following portions of the patient's history were reviewed and updated as appropriate: allergies, current medications, past family history, past medical history, past social history, past surgical history and problem list.    Review of Systems   Eyes: Negative for visual disturbance.   Neurological: Negative for dizziness and headaches.       Objective     Vitals:    09/20/22 0827   BP: 118/68   Pulse: 65   Temp: 97.8 °F (36.6 °C)   SpO2: 98%   Weight: 81.6 kg (180 lb)   Height: 172.7 cm (67.99\")     Body mass index is 27.38 kg/m².      Physical Exam  Neurologic Exam    Physical Exam:    CONSTITUTIONAL:  appears well developed, well-nourished and in no acute distress.    NECK: the neck is supple and symmetric. The trachea is midline with no masses.      PULMONARY: Respiratory effort is normal with no increased work of breathing or signs of respiratory distress.    CARDIOVASCULAR: Pedal pulses are +2/4 bilaterally. Examination of the extremities shows no edema or varicosities.    MUSCULOSKELETAL: Gait normal    SKIN: The skin is warm, dry and intact.      NEUROLOGIC:   Cranial nerves II through XII grossly intact  Normal motor strength noted. Muscle bulk and tone are normal.  Sensory exam is normal to fine touch  Reflexes 2/4 and symmetrical at the bicep, tricep, " brachioradialis, knee, and ankle.  Cortical function is intact and without deficits. Speech is normal.    PSYCHIATRIC: oriented to person, place and time. Patient's mood and affect are normal.    Assessment & Plan   Independent Review of Radiographic Studies:      I personally reviewed the images from the following studies.    MRI of the brain with and without contrast done on September 7, 2022 at [facility] reveals a small meningioma over the right frontal lobe measuring 5 x 4 x 8 mm in size and is unchanged from the MRI done 1 year prior.    Medical Decision Making:      MRI of the brain reveals a stable appearance of a very small incidentally found meningioma in the right frontal region without any effect of the underlying frontal lobe.  Given the stable appearance we will continue to follow this annually for a total of 5 years to document stability.    Return in about 1 year (around 9/20/2023) for review of annual MRI.    Diagnoses and all orders for this visit:    1. Meningioma (HCC) (Primary)  -     MRI Brain With & Without Contrast; Future             Fer Herbert MD FACS FAANS  Neurological Surgery

## 2022-09-20 ENCOUNTER — OFFICE VISIT (OUTPATIENT)
Dept: NEUROSURGERY | Facility: CLINIC | Age: 67
End: 2022-09-20

## 2022-09-20 VITALS
BODY MASS INDEX: 27.28 KG/M2 | TEMPERATURE: 97.8 F | HEIGHT: 68 IN | SYSTOLIC BLOOD PRESSURE: 118 MMHG | DIASTOLIC BLOOD PRESSURE: 68 MMHG | WEIGHT: 180 LBS | HEART RATE: 65 BPM | OXYGEN SATURATION: 98 %

## 2022-09-20 DIAGNOSIS — D32.9 MENINGIOMA: Primary | ICD-10-CM

## 2022-09-20 PROCEDURE — 99213 OFFICE O/P EST LOW 20 MIN: CPT | Performed by: NEUROLOGICAL SURGERY

## 2022-10-14 ENCOUNTER — OFFICE VISIT (OUTPATIENT)
Dept: FAMILY MEDICINE CLINIC | Facility: CLINIC | Age: 67
End: 2022-10-14

## 2022-10-14 VITALS
WEIGHT: 180.8 LBS | TEMPERATURE: 98 F | DIASTOLIC BLOOD PRESSURE: 80 MMHG | HEART RATE: 66 BPM | BODY MASS INDEX: 27.4 KG/M2 | SYSTOLIC BLOOD PRESSURE: 118 MMHG | OXYGEN SATURATION: 97 % | HEIGHT: 68 IN | RESPIRATION RATE: 16 BRPM

## 2022-10-14 DIAGNOSIS — F41.1 GENERALIZED ANXIETY DISORDER: ICD-10-CM

## 2022-10-14 DIAGNOSIS — I10 ESSENTIAL HYPERTENSION: ICD-10-CM

## 2022-10-14 DIAGNOSIS — K13.70 LESION OF MOUTH: Primary | ICD-10-CM

## 2022-10-14 PROBLEM — F41.9 ANXIETY: Status: RESOLVED | Noted: 2022-02-10 | Resolved: 2022-10-14

## 2022-10-14 PROCEDURE — 99213 OFFICE O/P EST LOW 20 MIN: CPT | Performed by: NURSE PRACTITIONER

## 2022-10-14 NOTE — PROGRESS NOTES
"Chief Complaint  Growth inside mouth (Behind bottom teeth in the middle, soft and squishy and white)    Subjective        Maximus Bhardwaj Sr. presents to [facility]  History of Present Illness  Patient presents to the office with a skin lesion in mouth.  He reports he noticed this yesterday.  He reports a sore on inside of mouth.  He denies pain when eating or drinking.  He denies sore throat.  Blood pressure is 118/80 today.  He denies chest pain shortness of air.  He is up-to-date for his dental visit.  Patient has general his anxiety disorder.  He is stable.      Objective   Vital Signs:  /80 (BP Location: Left arm, Patient Position: Sitting, Cuff Size: Adult)   Pulse 66   Temp 98 °F (36.7 °C)   Resp 16   Ht 172.7 cm (67.99\")   Wt 82 kg (180 lb 12.8 oz)   SpO2 97%   BMI 27.50 kg/m²   Estimated body mass index is 27.5 kg/m² as calculated from the following:    Height as of this encounter: 172.7 cm (67.99\").    Weight as of this encounter: 82 kg (180 lb 12.8 oz).          Physical Exam  Constitutional:       General: He is not in acute distress.     Appearance: Normal appearance.   HENT:      Mouth/Throat:      Lips: Pink. No lesions.      Mouth: Oral lesions present.      Dentition: Normal dentition.      Tongue: Lesions present.      Palate: No mass and lesions.      Pharynx: No pharyngeal swelling or oropharyngeal exudate.      Tonsils: No tonsillar exudate or tonsillar abscesses.        Comments: Lesion under tongue, white no pain.  Eyes:      Pupils: Pupils are equal, round, and reactive to light.   Cardiovascular:      Rate and Rhythm: Normal rate and regular rhythm.      Pulses: Normal pulses.      Heart sounds: Normal heart sounds.   Pulmonary:      Effort: Pulmonary effort is normal.      Breath sounds: Normal breath sounds. No wheezing or rales.   Neurological:      Mental Status: He is alert.   Psychiatric:         Mood and Affect: Mood is anxious. Mood is " not depressed.         Behavior: Behavior normal.        Result Review :                Assessment and Plan   Diagnoses and all orders for this visit:    1. Lesion of mouth (Primary)  Assessment & Plan:  Refer to ENT.  Advised patient to use salt water gargles and to follow-up in the office if he is develops any pain or inability to eat or drink related pain    Orders:  -     Ambulatory Referral to ENT (Otolaryngology)    2. Essential hypertension  Assessment & Plan:  Blood pressure stable      3. Generalized anxiety disorder  Assessment & Plan:  Stable             Follow Up   Return in about 5 months (around 3/18/2023) for Annual physical.  Patient was given instructions and counseling regarding his condition or for health maintenance advice. Please see specific information pulled into the AVS if appropriate.

## 2022-10-14 NOTE — ASSESSMENT & PLAN NOTE
Refer to ENT.  Advised patient to use salt water gargles and to follow-up in the office if he is develops any pain or inability to eat or drink related pain

## 2022-11-04 ENCOUNTER — OFFICE VISIT (OUTPATIENT)
Dept: FAMILY MEDICINE CLINIC | Facility: CLINIC | Age: 67
End: 2022-11-04

## 2022-11-04 VITALS
TEMPERATURE: 97.3 F | HEART RATE: 64 BPM | OXYGEN SATURATION: 98 % | DIASTOLIC BLOOD PRESSURE: 68 MMHG | SYSTOLIC BLOOD PRESSURE: 116 MMHG | BODY MASS INDEX: 27.58 KG/M2 | WEIGHT: 182 LBS | HEIGHT: 68 IN

## 2022-11-04 DIAGNOSIS — I10 ESSENTIAL HYPERTENSION: ICD-10-CM

## 2022-11-04 DIAGNOSIS — M54.40 MIDLINE LOW BACK PAIN WITH SCIATICA, SCIATICA LATERALITY UNSPECIFIED, UNSPECIFIED CHRONICITY: Primary | ICD-10-CM

## 2022-11-04 PROCEDURE — 99213 OFFICE O/P EST LOW 20 MIN: CPT | Performed by: INTERNAL MEDICINE

## 2022-11-04 RX ORDER — CYCLOBENZAPRINE HCL 10 MG
10 TABLET ORAL 3 TIMES DAILY PRN
Qty: 30 TABLET | Refills: 3 | Status: SHIPPED | OUTPATIENT
Start: 2022-11-04 | End: 2022-12-15

## 2022-11-04 RX ORDER — MELOXICAM 7.5 MG/1
7.5 TABLET ORAL DAILY
Qty: 60 TABLET | Refills: 3 | Status: SHIPPED | OUTPATIENT
Start: 2022-11-04 | End: 2022-12-15

## 2022-11-04 NOTE — PROGRESS NOTES
"Chief Complaint  lt side back pain    Subjective        Maximus Bhardwaj Sr. presents to [facility]  History of Present Illness patient was seen for thoracic back pain.  Patient developed midthoracic pain about 2 weeks ago.  Pain is sharp and severe and is located along the left scapular area.  Patient has tenderness to palpation with tight musculature.  Patient not recall an injury but has been doing a lot of yard work.  Patient is being sent to physical therapy was given meloxicam 7.5 mg with 2 Tylenol twice daily, Flexeril 10 mg at bedtime.  Patient did have a T-spine ordered at the hospital.  Patient will follow-up in 2 weeks.  Patient blood pressures been 110s over 80s.  Patient will continue present treatment.    Dictated utilizing Dragon dictation. If there are questions or for further clarification, please contact me.    Objective   Vital Signs:  Blood Pressure 116/68   Pulse 64   Temperature 97.3 °F (36.3 °C)   Height 172.7 cm (68\")   Weight 82.6 kg (182 lb)   Oxygen Saturation 98%   Body Mass Index 27.67 kg/m²   Estimated body mass index is 27.67 kg/m² as calculated from the following:    Height as of this encounter: 172.7 cm (68\").    Weight as of this encounter: 82.6 kg (182 lb).          Physical Exam   Result Review :                Assessment and Plan  #1 meloxicam 7.5 with 2 Tylenol twice daily as needed, Flexeril 10 mg at bedtime as needed, physical therapy.  #2 continue monitoring blood pressure #3 follow-up in 2 weeks  Diagnoses and all orders for this visit:    1. Midline low back pain with sciatica, sciatica laterality unspecified, unspecified chronicity (Primary)  -     Cancel: XR Spine Lumbar 2 or 3 View; Future  -     XR Spine Thoracic 2 View  -     Ambulatory Referral to Physical Therapy Evaluate and treat, Ortho    2. Essential hypertension    Other orders  -     meloxicam (Mobic) 7.5 MG tablet; Take 1 tablet by mouth Daily.  Dispense: 60 tablet; " Refill: 3  -     cyclobenzaprine (FLEXERIL) 10 MG tablet; Take 1 tablet by mouth 3 (Three) Times a Day As Needed for Muscle Spasms (Do not Drive).  Dispense: 30 tablet; Refill: 3             Follow Up   Return in about 2 weeks (around 11/18/2022).  Patient was given instructions and counseling regarding his condition or for health maintenance advice. Please see specific information pulled into the AVS if appropriate.

## 2022-12-07 RX ORDER — ATORVASTATIN CALCIUM 10 MG/1
TABLET, FILM COATED ORAL
Qty: 90 TABLET | Refills: 1 | Status: SHIPPED | OUTPATIENT
Start: 2022-12-07

## 2022-12-15 ENCOUNTER — TELEPHONE (OUTPATIENT)
Dept: FAMILY MEDICINE CLINIC | Facility: CLINIC | Age: 67
End: 2022-12-15

## 2022-12-15 RX ORDER — CYCLOBENZAPRINE HCL 10 MG
TABLET ORAL
Qty: 30 TABLET | Refills: 3 | Status: SHIPPED | OUTPATIENT
Start: 2022-12-15 | End: 2023-03-10

## 2023-01-04 RX ORDER — CLONAZEPAM 0.5 MG/1
TABLET ORAL
Qty: 30 TABLET | Refills: 0 | Status: SHIPPED | OUTPATIENT
Start: 2023-01-04 | End: 2023-01-24 | Stop reason: SDUPTHER

## 2023-01-24 RX ORDER — CLONAZEPAM 0.5 MG/1
0.5 TABLET ORAL 2 TIMES DAILY PRN
Qty: 60 TABLET | Refills: 3 | Status: SHIPPED | OUTPATIENT
Start: 2023-01-24

## 2023-01-24 NOTE — TELEPHONE ENCOUNTER
Caller: Maximus Bhardwaj Sr.    Relationship: Self    Best call back number: [phone]     Requested Prescriptions:   Requested Prescriptions     Pending Prescriptions Disp Refills   • clonazePAM (KlonoPIN) 0.5 MG tablet 30 tablet 0     Sig: Take 1 tablet by mouth 2 (Two) Times a Day As Needed for Anxiety.        Pharmacy where request should be sent: [facility] [id number] [address] [phone] [facility] [phone] FX     Does the patient have less than a 3 day supply:  [x] Yes  [] No    Would you like a call back once the refill request has been completed: [] Yes [x] No    If the office needs to give you a call back, can they leave a voicemail: [] Yes [x] No    Kenyon Tamayo Rep   01/24/23 10:57 EST

## 2023-02-28 ENCOUNTER — OFFICE VISIT (OUTPATIENT)
Dept: SLEEP MEDICINE | Facility: HOSPITAL | Age: 68
End: 2023-02-28
Payer: MEDICARE

## 2023-02-28 VITALS
OXYGEN SATURATION: 95 % | SYSTOLIC BLOOD PRESSURE: 133 MMHG | BODY MASS INDEX: 28.04 KG/M2 | HEIGHT: 68 IN | HEART RATE: 74 BPM | WEIGHT: 185 LBS | DIASTOLIC BLOOD PRESSURE: 79 MMHG

## 2023-02-28 DIAGNOSIS — Z78.9 DIFFICULTY WITH CPAP NASAL MASK USE: ICD-10-CM

## 2023-02-28 DIAGNOSIS — G47.33 OBSTRUCTIVE SLEEP APNEA: Primary | ICD-10-CM

## 2023-02-28 DIAGNOSIS — E66.3 OVERWEIGHT (BMI 25.0-29.9): ICD-10-CM

## 2023-02-28 PROCEDURE — G0463 HOSPITAL OUTPT CLINIC VISIT: HCPCS

## 2023-02-28 NOTE — PROGRESS NOTES
"[facility] SLEEP MEDICINE  [address]  [address]  [place] [address] [phone]    PCP: Patrick Licona MD    Reason for visit:  Sleep disorders: MARRY    Maximus is a 67 y.o.male who was seen in the Sleep Disorders Center today. Annual fu. He is compliant with CPAP and finds it reduces arousals at night. Otherwise no change in daytime alertness and denies EDS. He sleeps from 10pm to 3am. Using nasal cushion, fits well but air leaks when he moves.  Revere Sleepiness Scale is 1. Caffeine 4 per day. Alcohol 0 per week.    Maximus  reports that he quit smoking about 3 years ago. His smoking use included cigarettes. He has a 92.00 pack-year smoking history. He has never used smokeless tobacco.    Pertinent Positive Review of Systems of anxiety  Rest of Review of Systems was negative as recorded in Sleep Questionnaire.    Patient  has a past medical history of Anxiety, Bladder tumor, COVID (06/14/2022), Hyperlipidemia, Hypertension, and Sleep apnea.     Current Medications:    Current Outpatient Medications:   •  atorvastatin (LIPITOR) 10 MG tablet, TAKE ONE TABLET BY MOUTH ONCE NIGHTLY, Disp: 90 tablet, Rfl: 1  •  clonazePAM (KlonoPIN) 0.5 MG tablet, Take 1 tablet by mouth 2 (Two) Times a Day As Needed for Anxiety., Disp: 60 tablet, Rfl: 3  •  cyclobenzaprine (FLEXERIL) 10 MG tablet, TAKE ONE TABLET BY MOUTH THREE TIMES A DAY AS NEEDED FOR MUSCLE SPASMS (DO NOT DRIVE WHILE ON THIS MEDICATION), Disp: 30 tablet, Rfl: 3  •  sertraline (ZOLOFT) 50 MG tablet, TAKE ONE TABLET BY MOUTH DAILY, Disp: 30 tablet, Rfl: 3   also entered in Sleep Questionnaire         Vital Signs: /79   Pulse 74   Ht 172.7 cm (67.99\")   Wt 83.9 kg (185 lb)   SpO2 95%   BMI 28.14 kg/m²     Body mass index is 28.14 kg/m².       Tongue: Large       Dentition: good       Pharynx: Posterior pharyngeal pillars are unable to see   Mallampatti: IV (only hard palate visible)        General: Alert. Cooperative. Well " developed. No acute distress.             Head:  Normocephalic. Symmetrical. Atraumatic.              Nose: No septal deviation. No drainage.          Throat: No oral lesions. No thrush. Moist mucous membranes.    Chest Wall:  Normal shape. Symmetric expansion with respiration. No tenderness.             Neck:  Trachea midline.           Lungs:  Clear to auscultation bilaterally. No wheezes. No rhonchi. No rales. Respirations regular, even and unlabored.            Heart:  Regular rhythm and normal rate. Normal S1 and S2. No murmur.     Abdomen:  Soft, non-tender and non-distended. Normal bowel sounds. No masses.  Extremities:  Moves all extremities well. No edema.    Psychiatric: Normal mood and affect.    Diagnostic data available to date is as below and was reviewed on current visit:  · 9/8/21  The patient tolerated the home sleep testing with monitoring time of 461 minutes. The data obtained make this a technically adequate study. The apnea hypopneas index(AHI) was 16.9 per sleep hour.  The AHI during supine position was 1.9 per sleep hour. Mean heart rate of 63.9 BPM.  Snoring was noted 47.1% of sleep time. Lowest oxygen saturation during the study was 77%. Saturation below 89% was noted for 20.6 mins.     No results found for: IRON, TIBC, FERRITIN    Most current available usage data reviewed on 02/28/2023:  · 100% compliance average 5-1/2 hours AHI 1.2 average pressure 10.6 auto CPAP 5-15    DME Company: [employer]    Prescription to [facility] for replacement supplies as below:    nasal mask      Description Replacement    Nasal PILLOWS      A 7034 Nasal Pillows  every 3 mth    A 7033 Repl Nasal Pillows  2 per mth    Nasal MASK/CUSHION     x A 7034 Nasal Mask/Cushion  every 3 mth   x A 7032 Repl Nasal Mask/Cushion  2 per mth    Full Face MASK      A 7030 Full Face Mask  every 3 mth    A 7031 Repl Face Mask  1 per mth      A 4604 Heated Tubing  every 3 mth    A 7037 Standard Tubing  every 3 mth   x A 7035 Headgear   every 3 mth   x A 7046 Repl Humidifier Chamber  every 6 yrs   x A 7038 Disposable Filters  2 per mth   x A 7039 Non-disposable Filter  every 6 mth   x A 7036 Chin Strap  every 6 mth     No orders of the defined types were placed in this encounter.         Impression:  1. Obstructive sleep apnea    2. Overweight (BMI 25.0-29.9)    3. Difficulty with CPAP nasal mask use        Plan:  Maximus is compliant with CPAP and wakes up rested for the machine.  Keep same settings. Can try different style of nasal mask as current device moves when he turns his head at night.  He can contact his DME for same or come here for a mask fit by technician..    I reiterated the importance of effective treatment of obstructive sleep apnea with PAP machine.  Cardiovascular health risks of untreated sleep apnea were again reviewed.  Patient was asked to remain cautious if there is persistent hypersomnolence. The benefit of weight loss in reducing severity of obstructive sleep apnea was discussed.  Patient would benefit from adhering to a strict diet to achieve ideal BMI.     Change of PAP supplies regularly is important for effective use.  Change of cushion on the mask or plugs on nasal pillows along with disposable filters once every month and change of mask frame, tubing, headgear and Velcro straps every 6 months at the minimum was reiterated.    This patient is compliant with PAP machine and benefits from its use.  Apnea hypopneas index is corrected/improved.  Daytime hypersomnolence has resolved.     Patient will follow up in this clinic in 1 year APRN    Thank you for allowing me to participate in your patient's care.    Electronically signed by John Lopez MD, 02/28/23, 1:37 PM EST.    Part of this note may be an electronic transcription/translation of spoken language to printed text using the Dragon Dictation System.

## 2023-03-10 RX ORDER — CYCLOBENZAPRINE HCL 10 MG
TABLET ORAL
Qty: 30 TABLET | Refills: 3 | Status: SHIPPED | OUTPATIENT
Start: 2023-03-10

## 2023-03-15 ENCOUNTER — OFFICE VISIT (OUTPATIENT)
Dept: FAMILY MEDICINE CLINIC | Facility: CLINIC | Age: 68
End: 2023-03-15
Payer: MEDICARE

## 2023-03-15 VITALS
OXYGEN SATURATION: 97 % | TEMPERATURE: 98.2 F | SYSTOLIC BLOOD PRESSURE: 132 MMHG | DIASTOLIC BLOOD PRESSURE: 81 MMHG | HEART RATE: 84 BPM | BODY MASS INDEX: 28.04 KG/M2 | HEIGHT: 68 IN | RESPIRATION RATE: 16 BRPM | WEIGHT: 185 LBS

## 2023-03-15 DIAGNOSIS — Z00.00 MEDICARE ANNUAL WELLNESS VISIT, SUBSEQUENT: Primary | ICD-10-CM

## 2023-03-15 DIAGNOSIS — K57.90 DIVERTICULOSIS: ICD-10-CM

## 2023-03-15 DIAGNOSIS — Z12.5 PROSTATE CANCER SCREENING: ICD-10-CM

## 2023-03-15 DIAGNOSIS — I10 ESSENTIAL HYPERTENSION: ICD-10-CM

## 2023-03-15 DIAGNOSIS — M50.30 DDD (DEGENERATIVE DISC DISEASE), CERVICAL: ICD-10-CM

## 2023-03-15 PROCEDURE — G0439 PPPS, SUBSEQ VISIT: HCPCS | Performed by: INTERNAL MEDICINE

## 2023-03-15 PROCEDURE — 1159F MED LIST DOCD IN RCRD: CPT | Performed by: INTERNAL MEDICINE

## 2023-03-15 PROCEDURE — 1170F FXNL STATUS ASSESSED: CPT | Performed by: INTERNAL MEDICINE

## 2023-03-15 PROCEDURE — 3079F DIAST BP 80-89 MM HG: CPT | Performed by: INTERNAL MEDICINE

## 2023-03-15 PROCEDURE — 1160F RVW MEDS BY RX/DR IN RCRD: CPT | Performed by: INTERNAL MEDICINE

## 2023-03-15 PROCEDURE — 3075F SYST BP GE 130 - 139MM HG: CPT | Performed by: INTERNAL MEDICINE

## 2023-03-15 NOTE — PROGRESS NOTES
QUICK REFERENCE INFORMATION:  The ABCs of the Annual Wellness Visit    Subsequent Medicare Wellness Visit patient was seen for Medicare wellness exam.    Dictated utilizing Dragon dictation. If there are questions or for further clarification, please contact me.    HEALTH RISK ASSESSMENT    1955    Recent Hospitalizations:  No hospitalization(s) within the last year..        Current Medical Providers:  Patient Care Team:  Patrick Licona MD as PCP - General (Internal Medicine)  Luis Paige MD as Consulting Physician (Pulmonary Disease)  Jerome Baldwin MD as Consulting Physician (Gastroenterology)  Gage Mo MD as Consulting Physician (Cardiology)        Smoking Status:  Social History     Tobacco Use   Smoking Status Former   • Packs/day: 2.00   • Years: 46.00   • Pack years: 92.00   • Types: Cigarettes, Electronic Cigarette   • Start date: 7/11/1970   • Quit date: 4/28/2019   • Years since quitting: 3.8   Smokeless Tobacco Never   Tobacco Comments    not interested  quit w hypnosis for a while       Alcohol Consumption:  Social History     Substance and Sexual Activity   Alcohol Use Not Currently    Comment: occass       Depression Screen:   PHQ-2/PHQ-9 Depression Screening 3/15/2023   Retired PHQ-9 Total Score -   Retired Total Score -   Little Interest or Pleasure in Doing Things 0-->not at all   Feeling Down, Depressed or Hopeless 0-->not at all   PHQ-9: Brief Depression Severity Measure Score 0       Health Habits and Functional and Cognitive Screening:  Functional & Cognitive Status 3/15/2023   Do you have difficulty preparing food and eating? No   Do you have difficulty bathing yourself, getting dressed or grooming yourself? No   Do you have difficulty using the toilet? No   Do you have difficulty moving around from place to place? No   Do you have trouble with steps or getting out of a bed or a chair? No   Current Diet Well Balanced Diet   Dental Exam Up to date   Eye Exam Up to date    Exercise (times per week) 5 times per week   Current Exercises Include Aerobics   Do you need help using the phone?  No   Are you deaf or do you have serious difficulty hearing?  No   Do you need help with transportation? No   Do you need help shopping? No   Do you need help preparing meals?  No   Do you need help with housework?  No   Do you need help with laundry? No   Do you need help taking your medications? No   Do you need help managing money? No   Do you ever drive or ride in a car without wearing a seat belt? No   Have you felt unusual stress, anger or loneliness in the last month? No   Who do you live with? Alone   If you need help, do you have trouble finding someone available to you? No   Have you been bothered in the last four weeks by sexual problems? No   Do you have difficulty concentrating, remembering or making decisions? No           Does the patient have evidence of cognitive impairment? No    Aspirin use counseling: Does not need ASA (and currently is not on it)      Recent Lab Results:  CMP:  Lab Results   Component Value Date    BUN 11 06/28/2022    CREATININE 0.90 09/07/2022    EGFRIFNONA 88 01/05/2022    BCR 12.5 06/28/2022     06/28/2022    K 5.2 06/28/2022    CO2 26.0 06/28/2022    CALCIUM 9.4 06/28/2022    ALBUMIN 3.90 06/28/2022    BILITOT 0.3 06/28/2022    ALKPHOS 89 06/28/2022    AST 18 06/28/2022    ALT 15 06/28/2022     Lipid Panel:  Lab Results   Component Value Date    CHOL 155 06/28/2022    TRIG 121 06/28/2022    HDL 27 (L) 06/28/2022    VLDL 22 06/28/2022    LDLHDL 3.84 06/28/2022     HbA1c:       Visual Acuity:  No results found.    Age-appropriate Screening Schedule:  Refer to the list below for future screening recommendations based on patient's age, sex and/or medical conditions. Orders for these recommended tests are listed in the plan section. The patient has been provided with a written plan.    Health Maintenance   Topic Date Due   • COVID-19 Vaccine (1) Never done    • Pneumococcal Vaccine 65+ (1 - PCV) Never done   • LUNG CANCER SCREENING  Never done   • INFLUENZA VACCINE  03/31/2023 (Originally 8/1/2022)   • ZOSTER VACCINE (1 of 2) 11/04/2023 (Originally 7/11/2005)   • LIPID PANEL  06/28/2023   • ANNUAL WELLNESS VISIT  03/15/2024   • TDAP/TD VACCINES (2 - Td or Tdap) 12/03/2025   • COLORECTAL CANCER SCREENING  06/08/2031   • HEPATITIS C SCREENING  Completed   • AAA SCREEN (ONE-TIME)  Completed        Subjective   History of Present Illness    Maximus Bhardwaj Sr. is a 67 y.o. male who presents for an Subsequent Wellness Visit.    The following portions of the patient's history were reviewed and updated as appropriate: allergies, current medications, past family history, past medical history, past social history, past surgical history and problem list.    Outpatient Medications Prior to Visit   Medication Sig Dispense Refill   • atorvastatin (LIPITOR) 10 MG tablet TAKE ONE TABLET BY MOUTH ONCE NIGHTLY 90 tablet 1   • clonazePAM (KlonoPIN) 0.5 MG tablet Take 1 tablet by mouth 2 (Two) Times a Day As Needed for Anxiety. 60 tablet 3   • sertraline (ZOLOFT) 50 MG tablet TAKE ONE TABLET BY MOUTH DAILY 30 tablet 3   • cyclobenzaprine (FLEXERIL) 10 MG tablet TAKE ONE TABLET BY MOUTH THREE TIMES A DAY AS NEEDED FOR MUSCLE SPASMS DO NOT DRIVE WHILE ON  THIS MEDICATION (Patient not taking: Reported on 3/15/2023) 30 tablet 3     No facility-administered medications prior to visit.       Patient Active Problem List   Diagnosis   • Essential hypertension   • Generalized anxiety disorder   • Former smoker   • Pulmonary nodule   • Meningioma (HCC)   • Neck pain   • DDD (degenerative disc disease), cervical   • Diverticulosis   • Macular degeneration   • COVID-19 virus detected   • Vitamin D deficiency   • Productive cough   • Abnormal chest x-ray   • Lesion of mouth       Advance Care Planning:  ACP discussion was held with the patient during this visit. Patient does not have an advance  directive, information provided.    Identification of Risk Factors:  Risk factors include: Advance Directive Discussion.    Review of Systems   Constitutional: Negative for fatigue and fever.   HENT: Positive for congestion. Negative for trouble swallowing.    Eyes: Negative for discharge and visual disturbance.   Respiratory: Negative for choking and shortness of breath.    Cardiovascular: Negative for chest pain and palpitations.   Gastrointestinal: Negative for abdominal pain and blood in stool.   Endocrine: Negative.    Genitourinary: Negative for genital sores and hematuria.   Musculoskeletal: Negative for gait problem and joint swelling.   Skin: Negative for color change, pallor, rash and wound.   Allergic/Immunologic: Positive for environmental allergies. Negative for immunocompromised state.   Neurological: Negative for facial asymmetry and speech difficulty.   Psychiatric/Behavioral: Negative for hallucinations and suicidal ideas.       Compared to one year ago, the patient feels his physical health is the same.  Compared to one year ago, the patient feels his mental health is the same.    Objective     Physical Exam  Vitals and nursing note reviewed.   Constitutional:       Appearance: Normal appearance. He is well-developed.   HENT:      Head: Normocephalic and atraumatic.      Nose: Nose normal.      Mouth/Throat:      Mouth: Mucous membranes are moist.      Pharynx: Oropharynx is clear.   Eyes:      Extraocular Movements: Extraocular movements intact.      Conjunctiva/sclera: Conjunctivae normal.      Pupils: Pupils are equal, round, and reactive to light.   Cardiovascular:      Rate and Rhythm: Normal rate and regular rhythm.      Heart sounds: Normal heart sounds. No murmur heard.    No friction rub. No gallop.   Pulmonary:      Effort: Pulmonary effort is normal. No respiratory distress.      Breath sounds: Normal breath sounds. No stridor. No wheezing, rhonchi or rales.   Chest:      Chest wall: No  "tenderness.   Abdominal:      General: Bowel sounds are normal.      Palpations: Abdomen is soft.   Musculoskeletal:         General: Normal range of motion.      Cervical back: Normal range of motion and neck supple.   Skin:     General: Skin is warm and dry.   Neurological:      General: No focal deficit present.      Mental Status: He is alert and oriented to person, place, and time. Mental status is at baseline.   Psychiatric:         Mood and Affect: Mood normal.         Behavior: Behavior normal.         Thought Content: Thought content normal.         Judgment: Judgment normal.         Vitals:    03/15/23 1153   BP: 132/81   BP Location: Left arm   Patient Position: Sitting   Cuff Size: Adult   Pulse: 84   Resp: 16   Temp: 98.2 °F (36.8 °C)   TempSrc: Infrared   SpO2: 97%   Weight: 83.9 kg (185 lb)   Height: 172.7 cm (67.99\")       BMI is >= 25 and <30. (Overweight) The following options were offered after discussion;: NA      Assessment & Plan  1 wellness exam #2 labs  Patient Self-Management and Personalized Health Advice  The patient has been provided with information about: NA and preventive services including:   · NA.    Visit Diagnoses:    ICD-10-CM ICD-9-CM   1. Medicare annual wellness visit, subsequent  Z00.00 V70.0   2. Essential hypertension  I10 401.9   3. Diverticulosis  K57.90 562.10   4. DDD (degenerative disc disease), cervical  M50.30 722.4   5. Prostate cancer screening  Z12.5 V76.44       Orders Placed This Encounter   Procedures   • CBC (No Diff)     Order Specific Question:   Release to patient     Answer:   Immediate   • Comprehensive Metabolic Panel     Order Specific Question:   Release to patient     Answer:   Immediate   • Lipid Panel   • PSA Screen     Order Specific Question:   Release to patient     Answer:   Routine Release       Outpatient Encounter Medications as of 3/15/2023   Medication Sig Dispense Refill   • atorvastatin (LIPITOR) 10 MG tablet TAKE ONE TABLET BY MOUTH ONCE " NIGHTLY 90 tablet 1   • clonazePAM (KlonoPIN) 0.5 MG tablet Take 1 tablet by mouth 2 (Two) Times a Day As Needed for Anxiety. 60 tablet 3   • sertraline (ZOLOFT) 50 MG tablet TAKE ONE TABLET BY MOUTH DAILY 30 tablet 3   • cyclobenzaprine (FLEXERIL) 10 MG tablet TAKE ONE TABLET BY MOUTH THREE TIMES A DAY AS NEEDED FOR MUSCLE SPASMS DO NOT DRIVE WHILE ON  THIS MEDICATION (Patient not taking: Reported on 3/15/2023) 30 tablet 3     No facility-administered encounter medications on file as of 3/15/2023.       Reviewed use of high risk medication in the elderly: not applicable  Reviewed for potential of harmful drug interactions in the elderly: not applicable    Follow Up:  Return in about 6 months (around 9/15/2023), or if symptoms worsen or fail to improve, for Recheck.     An After Visit Summary and PPPS with all of these plans were given to the patient.

## 2023-03-15 NOTE — PATIENT INSTRUCTIONS
Medicare Wellness  Personal Prevention Plan of Service     Date of Office Visit:    Encounter Provider:  Patrick Licona MD  Place of Service:  [facility]  Patient Name: Maximus Bhardwaj Sr.  :  1955    As part of the Medicare Wellness portion of your visit today, we are providing you with this personalized preventive plan of services (PPPS). This plan is based upon recommendations of the [place] Preventive Services Task Force (USPSTF) and the Advisory Committee on Immunization Practices (ACIP).    This lists the preventive care services that should be considered, and provides dates of when you are due. Items listed as completed are up-to-date and do not require any further intervention.    Health Maintenance   Topic Date Due    COVID-19 Vaccine (1) Never done    Pneumococcal Vaccine 65+ (1 - PCV) Never done    LUNG CANCER SCREENING  Never done    INFLUENZA VACCINE  2023 (Originally 2022)    ZOSTER VACCINE (1 of 2) 2023 (Originally 2005)    LIPID PANEL  2023    ANNUAL WELLNESS VISIT  03/15/2024    TDAP/TD VACCINES (2 - Td or Tdap) 2025    COLORECTAL CANCER SCREENING  2031    HEPATITIS C SCREENING  Completed    AAA SCREEN (ONE-TIME)  Completed       Orders Placed This Encounter   Procedures    CBC (No Diff)     Order Specific Question:   Release to patient     Answer:   Immediate    Comprehensive Metabolic Panel     Order Specific Question:   Release to patient     Answer:   Immediate    Lipid Panel    PSA Screen     Order Specific Question:   Release to patient     Answer:   Routine Release       No follow-ups on file.

## 2023-03-16 LAB
ALBUMIN SERPL-MCNC: 4.5 G/DL (ref 3.8–4.8)
ALBUMIN/GLOB SERPL: 1.7 {RATIO} (ref 1.2–2.2)
ALP SERPL-CCNC: 99 IU/L (ref 44–121)
ALT SERPL-CCNC: 16 IU/L (ref 0–44)
AST SERPL-CCNC: 25 IU/L (ref 0–40)
BILIRUB SERPL-MCNC: 0.4 MG/DL (ref 0–1.2)
BUN SERPL-MCNC: 15 MG/DL (ref 8–27)
BUN/CREAT SERPL: 17 (ref 10–24)
CALCIUM SERPL-MCNC: 9.4 MG/DL (ref 8.6–10.2)
CHLORIDE SERPL-SCNC: 101 MMOL/L (ref 96–106)
CHOLEST SERPL-MCNC: 169 MG/DL (ref 100–199)
CO2 SERPL-SCNC: 25 MMOL/L (ref 20–29)
CREAT SERPL-MCNC: 0.86 MG/DL (ref 0.76–1.27)
EGFRCR SERPLBLD CKD-EPI 2021: 95 ML/MIN/1.73
ERYTHROCYTE [DISTWIDTH] IN BLOOD BY AUTOMATED COUNT: 12.7 % (ref 11.6–15.4)
GLOBULIN SER CALC-MCNC: 2.7 G/DL (ref 1.5–4.5)
GLUCOSE SERPL-MCNC: 85 MG/DL (ref 70–99)
HCT VFR BLD AUTO: 46.2 % (ref 37.5–51)
HDLC SERPL-MCNC: 31 MG/DL
HGB BLD-MCNC: 15.4 G/DL (ref 13–17.7)
LDLC SERPL CALC-MCNC: 97 MG/DL (ref 0–99)
MCH RBC QN AUTO: 30.2 PG (ref 26.6–33)
MCHC RBC AUTO-ENTMCNC: 33.3 G/DL (ref 31.5–35.7)
MCV RBC AUTO: 91 FL (ref 79–97)
PLATELET # BLD AUTO: 233 X10E3/UL (ref 150–450)
POTASSIUM SERPL-SCNC: 5.1 MMOL/L (ref 3.5–5.2)
PROT SERPL-MCNC: 7.2 G/DL (ref 6–8.5)
PSA SERPL-MCNC: 1.2 NG/ML (ref 0–4)
RBC # BLD AUTO: 5.1 X10E6/UL (ref 4.14–5.8)
SODIUM SERPL-SCNC: 140 MMOL/L (ref 134–144)
TRIGL SERPL-MCNC: 240 MG/DL (ref 0–149)
VLDLC SERPL CALC-MCNC: 41 MG/DL (ref 5–40)
WBC # BLD AUTO: 6.2 X10E3/UL (ref 3.4–10.8)

## 2023-03-24 ENCOUNTER — TRANSCRIBE ORDERS (OUTPATIENT)
Dept: ADMINISTRATIVE | Facility: HOSPITAL | Age: 68
End: 2023-03-24
Payer: MEDICARE

## 2023-03-24 DIAGNOSIS — Z87.891 PERSONAL HISTORY OF TOBACCO USE, PRESENTING HAZARDS TO HEALTH: Primary | ICD-10-CM

## 2023-04-25 ENCOUNTER — HOSPITAL ENCOUNTER (OUTPATIENT)
Dept: CT IMAGING | Facility: HOSPITAL | Age: 68
Discharge: HOME | End: 2023-04-25
Admitting: INTERNAL MEDICINE
Payer: MEDICARE

## 2023-04-25 DIAGNOSIS — Z87.891 PERSONAL HISTORY OF TOBACCO USE, PRESENTING HAZARDS TO HEALTH: ICD-10-CM

## 2023-04-25 PROCEDURE — 71271 CT THORAX LUNG CANCER SCR C-: CPT

## 2023-05-23 DIAGNOSIS — F41.1 GENERALIZED ANXIETY DISORDER: Primary | ICD-10-CM

## 2023-05-23 DIAGNOSIS — Z79.899 LONG TERM CURRENT USE OF ANTIPSYCHOTIC MEDICATION: ICD-10-CM

## 2023-05-23 NOTE — TELEPHONE ENCOUNTER
"Caller: Maximus Bhardwaj Sr. \"Vasu\"    Relationship: Self    Best call back number: [phone]    Requested Prescriptions:   Requested Prescriptions     Pending Prescriptions Disp Refills   • clonazePAM (KlonoPIN) 0.5 MG tablet 60 tablet 3     Sig: Take 1 tablet by mouth 2 (Two) Times a Day As Needed for Anxiety.        Pharmacy where request should be sent: [facility] [id number] [address] [phone] [facility] [phone]      Last office visit with prescribing clinician: 3/15/2023   Last telemedicine visit with prescribing clinician: Visit date not found   Next office visit with prescribing clinician: Visit date not found     Additional details provided by patient: PATIENT STATES HE HAS 2-3 DAYS LEFT ON THIS PRESCRIPTION.     Does the patient have less than a 3 day supply:  [x] Yes  [] No    Would you like a call back once the refill request has been completed: [] Yes [] No    If the office needs to give you a call back, can they leave a voicemail: [] Yes [] No    Kenyon Lazaro Rep   05/23/23 11:18 EDT         "

## 2023-05-24 ENCOUNTER — TELEPHONE (OUTPATIENT)
Dept: FAMILY MEDICINE CLINIC | Facility: CLINIC | Age: 68
End: 2023-05-24
Payer: MEDICARE

## 2023-05-25 NOTE — TELEPHONE ENCOUNTER
Sent [employer] message to inform patient to reschedule with another provider. Also due UDS and agreement.

## 2023-05-26 DIAGNOSIS — Z79.899 OTHER LONG TERM (CURRENT) DRUG THERAPY: ICD-10-CM

## 2023-05-26 DIAGNOSIS — F13.10 BENZODIAZEPINE ABUSE, CONTINUOUS: Primary | ICD-10-CM

## 2023-05-26 RX ORDER — CLONAZEPAM 0.5 MG/1
0.5 TABLET ORAL 2 TIMES DAILY PRN
Qty: 60 TABLET | Refills: 0 | Status: SHIPPED | OUTPATIENT
Start: 2023-05-26

## 2023-05-26 NOTE — TELEPHONE ENCOUNTER
30 day supply given need to follow up with Dr. Licona or new provider to continue getting medication

## 2023-08-25 ENCOUNTER — OFFICE VISIT (OUTPATIENT)
Dept: CARDIOLOGY | Facility: CLINIC | Age: 68
End: 2023-08-25
Payer: MEDICARE

## 2023-08-25 VITALS
HEART RATE: 69 BPM | WEIGHT: 173 LBS | BODY MASS INDEX: 26.22 KG/M2 | SYSTOLIC BLOOD PRESSURE: 124 MMHG | OXYGEN SATURATION: 97 % | HEIGHT: 68 IN | DIASTOLIC BLOOD PRESSURE: 78 MMHG

## 2023-08-25 DIAGNOSIS — I10 ESSENTIAL HYPERTENSION: Primary | ICD-10-CM

## 2023-08-25 PROCEDURE — 99214 OFFICE O/P EST MOD 30 MIN: CPT | Performed by: INTERNAL MEDICINE

## 2023-08-25 PROCEDURE — 93000 ELECTROCARDIOGRAM COMPLETE: CPT | Performed by: INTERNAL MEDICINE

## 2023-08-25 PROCEDURE — 3078F DIAST BP <80 MM HG: CPT | Performed by: INTERNAL MEDICINE

## 2023-08-25 PROCEDURE — 3074F SYST BP LT 130 MM HG: CPT | Performed by: INTERNAL MEDICINE

## 2023-08-25 NOTE — PROGRESS NOTES
Subjective:     Encounter Date:08/23/2022      Patient ID: Maximus Bhardwaj Sr. is a 68 y.o. male.    Chief Complaint: Diastolic dysfunction  HPI:   This is a 67-year-old man who was previously treated for hypertension and grade 1 diastolic dysfunction by my father Dr. Gage Mo.  He has not been seen in a few years but was taking lisinopril and amlodipine regularly.  He then quit smoking about a year ago and eventually stopped all nicotine products when he contracted COVID in June 22.  At that time he noticed a drop in his blood pressures with systolics in the 70s to 80s.  He was advised to stop his antihypertensives.  Since then he has been keeping close track of his blood pressure and have been in the 110s systolic.  He walks about 2 hours a day.  He lifts 20 pound weights.  He has no angina or dyspnea.  In the past he has had a normal nuclear stress test.  Echocardiogram showing grade 1 diastolic dysfunction.  He has had intermittent chest pain in the past but this has been related to anxiety and is well controlled on his current Zoloft therapy.  He is a former smoker.  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    The following portions of the patient's history were reviewed and updated as appropriate: allergies, current medications, past family history, past medical history, past social history, past surgical history and problem list.     REVIEW OF SYSTEMS:   All systems reviewed.  Pertinent positives identified in HPI.  All other systems are negative.    Past Medical History:   Diagnosis Date    Anxiety     Arthritis July 2021    Bladder tumor     Cataract 2021    COVID 06/14/2022    Diverticulosis 2021    Hyperlipidemia     Hypertension     Low back pain Hurt for 40 years    Sleep apnea        Family History   Problem Relation Age of Onset    Hypertension Mother     Allergies Mother     Heart disease Father     Alcohol abuse Father     Heart attack Father        Social History      Socioeconomic History    Marital status:     Number of children: 1   Tobacco Use    Smoking status: Former     Packs/day: 2.00     Years: 46.00     Pack years: 92.00     Types: Cigarettes, Electronic Cigarette     Start date: 1970     Quit date: 2019     Years since quittin.3    Smokeless tobacco: Never    Tobacco comments:     not interested  quit w hypnosis for a while   Vaping Use    Vaping Use: Former    Start date: 2019    Quit date: 2022    Substances: Nicotine, Flavoring    Devices: Disposable, Pre-filled pod   Substance and Sexual Activity    Alcohol use: Not Currently     Comment: occass    Drug use: Not Currently    Sexual activity: Not Currently       No Known Allergies    Past Surgical History:   Procedure Laterality Date    COLONOSCOPY  2015    Jerome Baldwin MD    CYSTOSCOPY  2014    Roberto Smith M.D.    EYE SURGERY         Procedures       Objective:         PHYSICAL EXAM:  GEN: VSS, no distress,   Eyes: normal sclera, normal lids and lashes  HENT: moist mucus membranes,   Respiratory: CTAB, no rales or wheezes  CV: RRR, no murmurs, , +2 DP and 2+ carotid pulses b/l  GI: NABS, soft,  Nontender, nondistended  MSK: no edema, no scoliosis or kyphosis  Skin: no rash, warm, dry  Heme/Lymph: no bruising or bleeding  Psych: organized thought, normal behavior and affect  Neuro: Cranial nerves grossly intact, Alert and Oriented x 3.         Assessment:          Diagnosis Plan   1. Essential hypertension  ECG 12 Lead             Plan:       1.  Hypertension: Now normotensive off drugs.  Monitor  2.  Grade 1 diastolic dysfunction: As noted on echocardiogram several years ago.  Euvolemic.  3.  Hyperlipidemia: Lipitor 10.    Dr. Licona, thank you very much for referring this kind patient to me. Please call me with any questions or concerns. I will see the patient again in the office in 1 year.         Camila Mo MD  23  [facility]    Outpatient Encounter Medications as of 8/25/2023   Medication Sig Dispense Refill    atorvastatin (LIPITOR) 10 MG tablet TAKE ONE TABLET BY MOUTH ONCE NIGHTLY 90 tablet 1    clonazePAM (KlonoPIN) 0.5 MG tablet Take 1 tablet by mouth 2 (Two) Times a Day As Needed for Anxiety. 60 tablet 0    [DISCONTINUED] cyclobenzaprine (FLEXERIL) 10 MG tablet TAKE ONE TABLET BY MOUTH THREE TIMES A DAY AS NEEDED FOR MUSCLE SPASMS DO NOT DRIVE WHILE ON  THIS MEDICATION (Patient not taking: Reported on 3/15/2023) 30 tablet 3    [DISCONTINUED] sertraline (ZOLOFT) 50 MG tablet TAKE ONE TABLET BY MOUTH DAILY 30 tablet 3     No facility-administered encounter medications on file as of 8/25/2023.

## 2023-08-25 NOTE — PROGRESS NOTES
Subjective:     Encounter Date:23        Patient ID: Maximus Bhardwaj Sr. is a 68 y.o. male.    Chief Complaint: Diastolic dysfunction  HPI:   This is a 68-year-old man who was previously treated for hypertension and grade 1 diastolic dysfunction by my father Dr. Gage Mo.    Since quitting smoking is very to exercise has been off of antihypertensive therapy for about a year.  Blood pressures are very well controlled.  He walks 3 to 4 miles a day without angina or dyspnea.    In the past he has had a normal nuclear stress test.  Echocardiogram showing grade 1 diastolic dysfunction.  He has had intermittent chest pain in the past but this has been related to anxiety and is well controlled on his current Zoloft therapy.    He is a former smoker.  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    The following portions of the patient's history were reviewed and updated as appropriate: allergies, current medications, past family history, past medical history, past social history, past surgical history and problem list.     REVIEW OF SYSTEMS:   All systems reviewed.  Pertinent positives identified in HPI.  All other systems are negative.    Past Medical History:   Diagnosis Date    Anxiety     Arthritis 2021    Bladder tumor     Cataract     COVID 2022    Diverticulosis     Hyperlipidemia     Hypertension     Low back pain Hurt for 40 years    Sleep apnea        Family History   Problem Relation Age of Onset    Hypertension Mother     Allergies Mother     Heart disease Father     Alcohol abuse Father     Heart attack Father        Social History     Socioeconomic History    Marital status:     Number of children: 1   Tobacco Use    Smoking status: Former     Packs/day: 2.00     Years: 46.00     Pack years: 92.00     Types: Cigarettes, Electronic Cigarette     Start date: 1970     Quit date: 2019     Years since quittin.3    Smokeless tobacco: Never     Tobacco comments:     not interested  quit w hypnosis for a while   Vaping Use    Vaping Use: Former    Start date: 4/28/2019    Quit date: 1/1/2022    Substances: Nicotine, Flavoring    Devices: Disposable, Pre-filled pod   Substance and Sexual Activity    Alcohol use: Not Currently     Comment: occass    Drug use: Not Currently    Sexual activity: Not Currently       No Known Allergies    Past Surgical History:   Procedure Laterality Date    COLONOSCOPY  08/26/2015    Jerome Baldwin MD    CYSTOSCOPY  08/19/2014    Roberto Smith M.D.    EYE SURGERY           ECG 12 Lead    Date/Time: 8/25/2023 11:59 AM  Performed by: Camila Mo MD  Authorized by: Camila Mo MD   Comparison: compared with previous ECG from 8/23/2022  Similar to previous ECG  Rhythm: sinus rhythm  Rate: normal  Conduction: conduction normal  ST Segments: ST segments normal  T Waves: T waves normal  QRS axis: normal  Other: no other findings    Clinical impression: normal ECG           Objective:         PHYSICAL EXAM:  GEN: VSS, no distress,   Eyes: normal sclera, normal lids and lashes  HENT: moist mucus membranes,   Respiratory: CTAB, no rales or wheezes  CV: RRR, no murmurs, , +2 DP and 2+ carotid pulses b/l  GI: NABS, soft,  Nontender, nondistended  MSK: no edema, no scoliosis or kyphosis  Skin: no rash, warm, dry  Heme/Lymph: no bruising or bleeding  Psych: organized thought, normal behavior and affect  Neuro: Cranial nerves grossly intact, Alert and Oriented x 3.         Assessment:          Diagnosis Plan   1. Essential hypertension  ECG 12 Lead             Plan:       1.  Hypertension: Now normotensive off drugs.  Monitor  2.  Grade 1 diastolic dysfunction: As noted on echocardiogram several years ago.  Euvolemic.  3.  Hyperlipidemia: Lipitor 10.    Dr. Licona, thank you very much for referring this kind patient to me. Please call me with any questions or concerns. I will see the patient again in the office in 1 year.          Camila Mo MD  08/25/23  [facility]    Outpatient Encounter Medications as of 8/25/2023   Medication Sig Dispense Refill    atorvastatin (LIPITOR) 10 MG tablet TAKE ONE TABLET BY MOUTH ONCE NIGHTLY 90 tablet 1    clonazePAM (KlonoPIN) 0.5 MG tablet Take 1 tablet by mouth 2 (Two) Times a Day As Needed for Anxiety. 60 tablet 0    [DISCONTINUED] cyclobenzaprine (FLEXERIL) 10 MG tablet TAKE ONE TABLET BY MOUTH THREE TIMES A DAY AS NEEDED FOR MUSCLE SPASMS DO NOT DRIVE WHILE ON  THIS MEDICATION (Patient not taking: Reported on 3/15/2023) 30 tablet 3    [DISCONTINUED] sertraline (ZOLOFT) 50 MG tablet TAKE ONE TABLET BY MOUTH DAILY 30 tablet 3     No facility-administered encounter medications on file as of 8/25/2023.

## 2023-09-12 ENCOUNTER — HOSPITAL ENCOUNTER (OUTPATIENT)
Dept: MRI IMAGING | Facility: HOSPITAL | Age: 68
Discharge: HOME | End: 2023-09-12
Admitting: NEUROLOGICAL SURGERY
Payer: MEDICARE

## 2023-09-12 DIAGNOSIS — D32.9 MENINGIOMA: ICD-10-CM

## 2023-09-12 PROCEDURE — A9577 INJ MULTIHANCE: HCPCS | Performed by: NEUROLOGICAL SURGERY

## 2023-09-12 PROCEDURE — 0 GADOBENATE DIMEGLUMINE 529 MG/ML SOLUTION: Performed by: NEUROLOGICAL SURGERY

## 2023-09-12 PROCEDURE — 70553 MRI BRAIN STEM W/O & W/DYE: CPT

## 2023-09-12 RX ADMIN — GADOBENATE DIMEGLUMINE 16 ML: 529 INJECTION, SOLUTION INTRAVENOUS at 10:14

## 2023-09-18 NOTE — PROGRESS NOTES
"Subjective   History of Present Illness: Maximus Bhardwaj Sr. is a 68 y.o. male is here today for 1 year  follow-up with a new brain MRI that was ordered to follow his meningioma.    Today, Mr. Bhardwaj reports he is doing well.  He had no major medical changes over the last year and has no physical complaints today.    History of Present Illness    Tobacco Use: Medium Risk    Smoking Tobacco Use: Former    Smokeless Tobacco Use: Never    Passive Exposure: Not on file        The following portions of the patient's history were reviewed and updated as appropriate: allergies, current medications, past family history, past medical history, past social history, past surgical history and problem list.    Review of Systems    Objective     Vitals:    09/21/23 0820   BP: 132/80   Weight: 78.5 kg (173 lb)   Height: 172.7 cm (67.99\")     Body mass index is 26.31 kg/m².      Physical Exam  Neurologic Exam    Physical Exam:    CONSTITUTIONAL:  appears well developed, well-nourished and in no acute distress.    NECK: the neck is supple and symmetric. The trachea is midline with no masses.      PULMONARY: Respiratory effort is normal with no increased work of breathing or signs of respiratory distress.    CARDIOVASCULAR: Pedal pulses are +2/4 bilaterally. Examination of the extremities shows no edema or varicosities.    MUSCULOSKELETAL: Gait normal    SKIN: The skin is warm, dry and intact.      NEUROLOGIC:   Radial nerves II through XII are grossly intact he has a little smile asymmetry with the right being a little depressed compared to the left  Normal motor strength noted without ulnar drift. Muscle bulk and tone are normal.  Sensory exam is normal to fine touch  Cortical function is intact and without deficits. Speech is normal.    PSYCHIATRIC: oriented to person, place and time. Patient's mood and affect are normal.    Assessment & Plan   Independent Review of Radiographic Studies:      I personally reviewed the images " from the following studies.    MRI of the brain with and without contrast done on September 12, 2023 compared to 2 prior studies done back to 2021 reveals no change in the appearance of the very tiny extra-axial dural based enhancing mass can patible with meningioma overlying the superior right frontal lobe measuring 7 x 5 x 4 mm in size.        Medical Decision Making:      Patient been followed now for 2 years from an incidentally found meningioma.  He has no symptoms referable to the meningioma and he has remained stable over the 2-year radiographic follow-up.  Therefore we will continue to follow the meningioma for a total of 5 years to make sure it does not show a threat or growth potential.    Return in about 1 year (around 9/21/2024) for review of annual MRI.    Diagnoses and all orders for this visit:    1. Meningioma (Primary)  -     MRI Brain With & Without Contrast; Future             Fer Herbert MD FACS [facility]  Neurological Surgery

## 2023-09-21 ENCOUNTER — OFFICE VISIT (OUTPATIENT)
Dept: NEUROSURGERY | Facility: CLINIC | Age: 68
End: 2023-09-21
Payer: MEDICARE

## 2023-09-21 VITALS
WEIGHT: 173 LBS | DIASTOLIC BLOOD PRESSURE: 80 MMHG | SYSTOLIC BLOOD PRESSURE: 132 MMHG | HEIGHT: 68 IN | BODY MASS INDEX: 26.22 KG/M2

## 2023-09-21 DIAGNOSIS — D32.9 MENINGIOMA: Primary | ICD-10-CM

## 2023-09-21 PROCEDURE — 1160F RVW MEDS BY RX/DR IN RCRD: CPT | Performed by: NEUROLOGICAL SURGERY

## 2023-09-21 PROCEDURE — 1159F MED LIST DOCD IN RCRD: CPT | Performed by: NEUROLOGICAL SURGERY

## 2023-09-21 PROCEDURE — 3075F SYST BP GE 130 - 139MM HG: CPT | Performed by: NEUROLOGICAL SURGERY

## 2023-09-21 PROCEDURE — 99213 OFFICE O/P EST LOW 20 MIN: CPT | Performed by: NEUROLOGICAL SURGERY

## 2023-09-21 PROCEDURE — 3079F DIAST BP 80-89 MM HG: CPT | Performed by: NEUROLOGICAL SURGERY

## 2024-03-21 ENCOUNTER — TRANSCRIBE ORDERS (OUTPATIENT)
Dept: ADMINISTRATIVE | Facility: HOSPITAL | Age: 69
End: 2024-03-21
Payer: MEDICARE

## 2024-03-21 DIAGNOSIS — Z87.891 PERSONAL HISTORY OF TOBACCO USE, PRESENTING HAZARDS TO HEALTH: Primary | ICD-10-CM

## 2024-04-17 ENCOUNTER — HOSPITAL ENCOUNTER (OUTPATIENT)
Facility: HOSPITAL | Age: 69
Discharge: HOME | End: 2024-04-17
Admitting: INTERNAL MEDICINE
Payer: MEDICARE

## 2024-04-17 DIAGNOSIS — Z87.891 PERSONAL HISTORY OF TOBACCO USE, PRESENTING HAZARDS TO HEALTH: ICD-10-CM

## 2024-04-17 PROCEDURE — 71271 CT THORAX LUNG CANCER SCR C-: CPT

## 2024-09-03 ENCOUNTER — OFFICE VISIT (OUTPATIENT)
Age: 69
End: 2024-09-03
Payer: MEDICARE

## 2024-09-03 VITALS
HEART RATE: 69 BPM | HEIGHT: 68 IN | SYSTOLIC BLOOD PRESSURE: 112 MMHG | WEIGHT: 164 LBS | DIASTOLIC BLOOD PRESSURE: 70 MMHG | BODY MASS INDEX: 24.86 KG/M2

## 2024-09-03 DIAGNOSIS — I50.30 HEART FAILURE WITH PRESERVED EJECTION FRACTION, UNSPECIFIED HF CHRONICITY: Primary | ICD-10-CM

## 2024-09-03 PROCEDURE — 3074F SYST BP LT 130 MM HG: CPT | Performed by: INTERNAL MEDICINE

## 2024-09-03 PROCEDURE — 1160F RVW MEDS BY RX/DR IN RCRD: CPT | Performed by: INTERNAL MEDICINE

## 2024-09-03 PROCEDURE — 99214 OFFICE O/P EST MOD 30 MIN: CPT | Performed by: INTERNAL MEDICINE

## 2024-09-03 PROCEDURE — 3078F DIAST BP <80 MM HG: CPT | Performed by: INTERNAL MEDICINE

## 2024-09-03 PROCEDURE — 93000 ELECTROCARDIOGRAM COMPLETE: CPT | Performed by: INTERNAL MEDICINE

## 2024-09-03 PROCEDURE — 1159F MED LIST DOCD IN RCRD: CPT | Performed by: INTERNAL MEDICINE

## 2024-09-03 RX ORDER — CYCLOBENZAPRINE HCL 10 MG
10 TABLET ORAL
COMMUNITY
Start: 2024-05-20

## 2024-09-03 NOTE — PROGRESS NOTES
Subjective:     Encounter Date:24        Patient ID: Maximus Bhardwaj Sr. is a 69 y.o. male.    Chief Complaint: Diastolic dysfunction  HPI:   This is a 69-year-old man who was previously treated for hypertension and grade 1 diastolic dysfunction by my father Dr. Gage Mo.      In the past he has had a normal nuclear stress test.  Echocardiogram showing grade 1 diastolic dysfunction. He is feeling well. He has no complaints. He is very active, walks daily several miles and does 35 pushups without angina or dyspnea. Normal blood pressures at home.     He is a former smoker.  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    The following portions of the patient's history were reviewed and updated as appropriate: allergies, current medications, past family history, past medical history, past social history, past surgical history and problem list.     REVIEW OF SYSTEMS:   All systems reviewed.  Pertinent positives identified in HPI.  All other systems are negative.    Past Medical History:   Diagnosis Date    Anxiety     Arthritis 2021    Bladder tumor     Cataract     COVID 2022    Diverticulosis     Hyperlipidemia     Hypertension     Low back pain [place] for 40 years    Sleep apnea        Family History   Problem Relation Age of Onset    Hypertension Mother     Allergies Mother     Heart disease Father 49    Alcohol abuse Father     Heart attack Father        Social History     Socioeconomic History    Marital status:     Number of children: 1   Tobacco Use    Smoking status: Former     Current packs/day: 0.00     Average packs/day: 2.0 packs/day for 48.8 years (97.6 ttl pk-yrs)     Types: Cigarettes, Electronic Cigarette     Start date: 1970     Quit date: 2019     Years since quittin.3    Smokeless tobacco: Never    Tobacco comments:     not interested  quit w hypnosis for a while   Vaping Use    Vaping status: Former    Start date: 2019     Quit date: 1/1/2022    Substances: Nicotine, Flavoring    Devices: Disposable, Pre-filled pod   Substance and Sexual Activity    Alcohol use: Not Currently     Comment: occass    Drug use: Not Currently    Sexual activity: Not Currently       No Known Allergies    Past Surgical History:   Procedure Laterality Date    COLONOSCOPY  08/26/2015    Jerome Baldwin MD    CYSTOSCOPY  08/19/2014    Roberto Smith M.D.    EYE SURGERY           ECG 12 Lead    Date/Time: 9/3/2024 3:26 PM  Performed by: Camila Mo MD    Authorized by: Camila Mo MD  Comparison: compared with previous ECG from 9/3/2024  Similar to previous ECG  Rhythm: sinus rhythm  Ectopy: unifocal PVCs  Rate: normal  Conduction: conduction normal  ST Segments: ST segments normal  T Waves: T waves normal  QRS axis: normal  Other: no other findings    Clinical impression: non-specific ECG           Objective:         PHYSICAL EXAM:  GEN: VSS, no distress,   Eyes: normal sclera, normal lids and lashes  HENT: moist mucus membranes,   Respiratory: CTAB, no rales or wheezes  CV: RRR, no murmurs, , +2 DP and 2+ carotid pulses b/l  GI: NABS, soft,  Nontender, nondistended  MSK: no edema, no scoliosis or kyphosis  Skin: no rash, warm, dry  Heme/Lymph: no bruising or bleeding  Psych: organized thought, normal behavior and affect  Neuro: Cranial nerves grossly intact, Alert and Oriented x 3.         Assessment:          Diagnosis Plan   1. Heart failure with preserved ejection fraction, unspecified HF chronicity             Plan:       1.  Hypertension: Now normotensive off drugs  2.  Grade 1 diastolic dysfunction: As noted on echocardiogram several years ago.  Euvolemic.  3.  Hyperlipidemia: Lipitor 10.    Dr. Licona, thank you very much for referring this kind patient to me. Please call me with any questions or concerns. I will see the patient again in the office in 1 year.         Camila Mo MD  09/03/24  [facility]    Outpatient  Encounter Medications as of 9/3/2024   Medication Sig Dispense Refill    atorvastatin (LIPITOR) 10 MG tablet TAKE ONE TABLET BY MOUTH ONCE NIGHTLY 90 tablet 1    clonazePAM (KlonoPIN) 0.5 MG tablet Take 1 tablet by mouth 2 (Two) Times a Day As Needed for Anxiety. 60 tablet 0     No facility-administered encounter medications on file as of 9/3/2024.

## 2024-09-19 ENCOUNTER — HOSPITAL ENCOUNTER (OUTPATIENT)
Dept: MRI IMAGING | Facility: HOSPITAL | Age: 69
Discharge: HOME | End: 2024-09-19
Admitting: NEUROLOGICAL SURGERY
Payer: MEDICARE

## 2024-09-19 DIAGNOSIS — D32.9 MENINGIOMA: ICD-10-CM

## 2024-09-19 PROCEDURE — A9577 INJ MULTIHANCE: HCPCS | Performed by: NEUROLOGICAL SURGERY

## 2024-09-19 PROCEDURE — 0 GADOBENATE DIMEGLUMINE 529 MG/ML SOLUTION: Performed by: NEUROLOGICAL SURGERY

## 2024-09-19 PROCEDURE — 70553 MRI BRAIN STEM W/O & W/DYE: CPT

## 2024-09-19 RX ADMIN — GADOBENATE DIMEGLUMINE 15 ML: 529 INJECTION, SOLUTION INTRAVENOUS at 08:20

## 2024-09-24 ENCOUNTER — OFFICE VISIT (OUTPATIENT)
Dept: NEUROSURGERY | Facility: CLINIC | Age: 69
End: 2024-09-24
Payer: MEDICARE

## 2024-09-24 VITALS
DIASTOLIC BLOOD PRESSURE: 78 MMHG | BODY MASS INDEX: 24.86 KG/M2 | HEIGHT: 68 IN | RESPIRATION RATE: 20 BRPM | WEIGHT: 164 LBS | SYSTOLIC BLOOD PRESSURE: 122 MMHG

## 2024-09-24 DIAGNOSIS — D32.9 MENINGIOMA: Primary | ICD-10-CM

## 2024-09-24 PROCEDURE — 99213 OFFICE O/P EST LOW 20 MIN: CPT | Performed by: NEUROLOGICAL SURGERY

## 2024-09-24 PROCEDURE — 3074F SYST BP LT 130 MM HG: CPT | Performed by: NEUROLOGICAL SURGERY

## 2024-09-24 PROCEDURE — 3078F DIAST BP <80 MM HG: CPT | Performed by: NEUROLOGICAL SURGERY

## 2024-09-24 PROCEDURE — 1159F MED LIST DOCD IN RCRD: CPT | Performed by: NEUROLOGICAL SURGERY

## 2024-09-24 PROCEDURE — 1160F RVW MEDS BY RX/DR IN RCRD: CPT | Performed by: NEUROLOGICAL SURGERY

## 2025-03-17 ENCOUNTER — TRANSCRIBE ORDERS (OUTPATIENT)
Dept: ADMINISTRATIVE | Facility: HOSPITAL | Age: 70
End: 2025-03-17
Payer: MEDICARE

## 2025-03-17 DIAGNOSIS — Z87.891 PERSONAL HISTORY OF TOBACCO USE, PRESENTING HAZARDS TO HEALTH: Primary | ICD-10-CM

## 2025-04-18 ENCOUNTER — HOSPITAL ENCOUNTER (OUTPATIENT)
Facility: HOSPITAL | Age: 70
Discharge: HOME | End: 2025-04-18
Payer: MEDICARE

## 2025-04-18 DIAGNOSIS — Z87.891 PERSONAL HISTORY OF TOBACCO USE, PRESENTING HAZARDS TO HEALTH: ICD-10-CM

## 2025-04-18 PROCEDURE — 71271 CT THORAX LUNG CANCER SCR C-: CPT

## 2025-06-10 ENCOUNTER — TRANSCRIBE ORDERS (OUTPATIENT)
Dept: ADMINISTRATIVE | Facility: HOSPITAL | Age: 70
End: 2025-06-10
Payer: MEDICARE

## 2025-06-10 DIAGNOSIS — Z87.891 PERSONAL HISTORY OF TOBACCO USE, PRESENTING HAZARDS TO HEALTH: Primary | ICD-10-CM
